# Patient Record
Sex: FEMALE | Race: ASIAN | NOT HISPANIC OR LATINO
[De-identification: names, ages, dates, MRNs, and addresses within clinical notes are randomized per-mention and may not be internally consistent; named-entity substitution may affect disease eponyms.]

---

## 2020-09-11 ENCOUNTER — APPOINTMENT (OUTPATIENT)
Dept: ANTEPARTUM | Facility: CLINIC | Age: 30
End: 2020-09-11
Payer: MEDICAID

## 2020-09-11 PROCEDURE — 76805 OB US >/= 14 WKS SNGL FETUS: CPT

## 2020-10-02 ENCOUNTER — APPOINTMENT (OUTPATIENT)
Dept: ANTEPARTUM | Facility: CLINIC | Age: 30
End: 2020-10-02
Payer: MEDICAID

## 2020-10-02 PROCEDURE — 76811 OB US DETAILED SNGL FETUS: CPT

## 2020-10-02 PROCEDURE — 76817 TRANSVAGINAL US OBSTETRIC: CPT

## 2020-11-24 ENCOUNTER — APPOINTMENT (OUTPATIENT)
Dept: OBGYN | Facility: CLINIC | Age: 30
End: 2020-11-24
Payer: MEDICAID

## 2020-11-24 PROCEDURE — 0502F SUBSEQUENT PRENATAL CARE: CPT

## 2020-12-12 ENCOUNTER — APPOINTMENT (OUTPATIENT)
Dept: ANTEPARTUM | Facility: CLINIC | Age: 30
End: 2020-12-12
Payer: MEDICAID

## 2020-12-12 PROCEDURE — 99072 ADDL SUPL MATRL&STAF TM PHE: CPT

## 2020-12-12 PROCEDURE — 76819 FETAL BIOPHYS PROFIL W/O NST: CPT

## 2020-12-12 PROCEDURE — 76816 OB US FOLLOW-UP PER FETUS: CPT

## 2020-12-15 ENCOUNTER — APPOINTMENT (OUTPATIENT)
Dept: OBGYN | Facility: CLINIC | Age: 30
End: 2020-12-15
Payer: MEDICAID

## 2020-12-15 PROCEDURE — 0502F SUBSEQUENT PRENATAL CARE: CPT

## 2020-12-21 ENCOUNTER — APPOINTMENT (OUTPATIENT)
Dept: OBGYN | Facility: CLINIC | Age: 30
End: 2020-12-21
Payer: MEDICAID

## 2020-12-21 PROCEDURE — 0502F SUBSEQUENT PRENATAL CARE: CPT

## 2021-01-02 ENCOUNTER — APPOINTMENT (OUTPATIENT)
Dept: OBGYN | Facility: CLINIC | Age: 31
End: 2021-01-02
Payer: MEDICAID

## 2021-01-02 PROCEDURE — 0502F SUBSEQUENT PRENATAL CARE: CPT

## 2021-01-07 ENCOUNTER — APPOINTMENT (OUTPATIENT)
Dept: OBGYN | Facility: CLINIC | Age: 31
End: 2021-01-07
Payer: MEDICAID

## 2021-01-07 PROCEDURE — 0502F SUBSEQUENT PRENATAL CARE: CPT

## 2021-01-16 ENCOUNTER — APPOINTMENT (OUTPATIENT)
Dept: ANTEPARTUM | Facility: CLINIC | Age: 31
End: 2021-01-16
Payer: MEDICAID

## 2021-01-16 PROCEDURE — 0502F SUBSEQUENT PRENATAL CARE: CPT

## 2021-01-25 ENCOUNTER — INPATIENT (INPATIENT)
Facility: HOSPITAL | Age: 31
LOS: 3 days | Discharge: HOME HEALTH | End: 2021-01-29
Attending: OBSTETRICS & GYNECOLOGY | Admitting: OBSTETRICS & GYNECOLOGY
Payer: MEDICAID

## 2021-01-25 ENCOUNTER — EMERGENCY (EMERGENCY)
Facility: HOSPITAL | Age: 31
LOS: 1 days | Discharge: STILL ADMITTED OR ADMINISTRATIVE | End: 2021-01-25
Admitting: EMERGENCY MEDICINE

## 2021-01-25 ENCOUNTER — APPOINTMENT (OUTPATIENT)
Dept: OBGYN | Facility: CLINIC | Age: 31
End: 2021-01-25
Payer: MEDICAID

## 2021-01-25 VITALS
TEMPERATURE: 98 F | SYSTOLIC BLOOD PRESSURE: 152 MMHG | RESPIRATION RATE: 18 BRPM | DIASTOLIC BLOOD PRESSURE: 99 MMHG | OXYGEN SATURATION: 100 % | HEART RATE: 105 BPM

## 2021-01-25 VITALS — DIASTOLIC BLOOD PRESSURE: 92 MMHG | HEART RATE: 100 BPM | SYSTOLIC BLOOD PRESSURE: 145 MMHG

## 2021-01-25 DIAGNOSIS — O26.899 OTHER SPECIFIED PREGNANCY RELATED CONDITIONS, UNSPECIFIED TRIMESTER: ICD-10-CM

## 2021-01-25 DIAGNOSIS — Z3A.00 WEEKS OF GESTATION OF PREGNANCY NOT SPECIFIED: ICD-10-CM

## 2021-01-25 PROBLEM — Z00.00 ENCOUNTER FOR PREVENTIVE HEALTH EXAMINATION: Status: ACTIVE | Noted: 2021-01-25

## 2021-01-25 PROCEDURE — 0502F SUBSEQUENT PRENATAL CARE: CPT

## 2021-01-25 RX ORDER — SODIUM CHLORIDE 9 MG/ML
1000 INJECTION, SOLUTION INTRAVENOUS
Refills: 0 | Status: DISCONTINUED | OUTPATIENT
Start: 2021-01-25 | End: 2021-01-26

## 2021-01-25 RX ORDER — CITRIC ACID/SODIUM CITRATE 300-500 MG
15 SOLUTION, ORAL ORAL EVERY 6 HOURS
Refills: 0 | Status: DISCONTINUED | OUTPATIENT
Start: 2021-01-25 | End: 2021-01-26

## 2021-01-25 RX ORDER — OXYTOCIN 10 UNIT/ML
333.33 VIAL (ML) INJECTION
Qty: 20 | Refills: 0 | Status: DISCONTINUED | OUTPATIENT
Start: 2021-01-25 | End: 2021-01-27

## 2021-01-25 NOTE — ED ADULT TRIAGE NOTE - CHIEF COMPLAINT QUOTE
Patient reports about 39 weeks pregnant sent by MD Pereira for concern of HTN. Patient denies any pain or vaginal bleeding. KULWANT 1/30. D&T called, Per Shanita, patient to go to L&D.

## 2021-01-26 LAB
ALBUMIN SERPL ELPH-MCNC: 3.4 G/DL — SIGNIFICANT CHANGE UP (ref 3.3–5)
ALBUMIN SERPL ELPH-MCNC: 3.6 G/DL — SIGNIFICANT CHANGE UP (ref 3.3–5)
ALBUMIN SERPL ELPH-MCNC: 3.7 G/DL — SIGNIFICANT CHANGE UP (ref 3.3–5)
ALP SERPL-CCNC: 165 U/L — HIGH (ref 40–120)
ALP SERPL-CCNC: 166 U/L — HIGH (ref 40–120)
ALP SERPL-CCNC: 171 U/L — HIGH (ref 40–120)
ALT FLD-CCNC: 16 U/L — SIGNIFICANT CHANGE UP (ref 4–33)
ALT FLD-CCNC: 20 U/L — SIGNIFICANT CHANGE UP (ref 4–33)
ALT FLD-CCNC: 21 U/L — SIGNIFICANT CHANGE UP (ref 4–33)
ANION GAP SERPL CALC-SCNC: 11 MMOL/L — SIGNIFICANT CHANGE UP (ref 7–14)
ANION GAP SERPL CALC-SCNC: 12 MMOL/L — SIGNIFICANT CHANGE UP (ref 7–14)
ANION GAP SERPL CALC-SCNC: 15 MMOL/L — HIGH (ref 7–14)
APPEARANCE UR: ABNORMAL
APPEARANCE UR: CLEAR — SIGNIFICANT CHANGE UP
APTT BLD: 29.8 SEC — SIGNIFICANT CHANGE UP (ref 27–36.3)
APTT BLD: 29.8 SEC — SIGNIFICANT CHANGE UP (ref 27–36.3)
AST SERPL-CCNC: 25 U/L — SIGNIFICANT CHANGE UP (ref 4–32)
AST SERPL-CCNC: 28 U/L — SIGNIFICANT CHANGE UP (ref 4–32)
AST SERPL-CCNC: 35 U/L — HIGH (ref 4–32)
BACTERIA # UR AUTO: ABNORMAL
BASOPHILS # BLD AUTO: 0 K/UL — SIGNIFICANT CHANGE UP (ref 0–0.2)
BASOPHILS # BLD AUTO: 0.04 K/UL — SIGNIFICANT CHANGE UP (ref 0–0.2)
BASOPHILS # BLD AUTO: 0.04 K/UL — SIGNIFICANT CHANGE UP (ref 0–0.2)
BASOPHILS NFR BLD AUTO: 0 % — SIGNIFICANT CHANGE UP (ref 0–2)
BASOPHILS NFR BLD AUTO: 0.3 % — SIGNIFICANT CHANGE UP (ref 0–2)
BASOPHILS NFR BLD AUTO: 0.5 % — SIGNIFICANT CHANGE UP (ref 0–2)
BILIRUB SERPL-MCNC: 0.2 MG/DL — SIGNIFICANT CHANGE UP (ref 0.2–1.2)
BILIRUB SERPL-MCNC: <0.2 MG/DL — SIGNIFICANT CHANGE UP (ref 0.2–1.2)
BILIRUB SERPL-MCNC: <0.2 MG/DL — SIGNIFICANT CHANGE UP (ref 0.2–1.2)
BILIRUB UR-MCNC: NEGATIVE — SIGNIFICANT CHANGE UP
BILIRUB UR-MCNC: NEGATIVE — SIGNIFICANT CHANGE UP
BLD GP AB SCN SERPL QL: NEGATIVE — SIGNIFICANT CHANGE UP
BUN SERPL-MCNC: 10 MG/DL — SIGNIFICANT CHANGE UP (ref 7–23)
BUN SERPL-MCNC: 10 MG/DL — SIGNIFICANT CHANGE UP (ref 7–23)
BUN SERPL-MCNC: 12 MG/DL — SIGNIFICANT CHANGE UP (ref 7–23)
CALCIUM SERPL-MCNC: 10 MG/DL — SIGNIFICANT CHANGE UP (ref 8.4–10.5)
CALCIUM SERPL-MCNC: 9 MG/DL — SIGNIFICANT CHANGE UP (ref 8.4–10.5)
CALCIUM SERPL-MCNC: 9.1 MG/DL — SIGNIFICANT CHANGE UP (ref 8.4–10.5)
CHLORIDE SERPL-SCNC: 103 MMOL/L — SIGNIFICANT CHANGE UP (ref 98–107)
CHLORIDE SERPL-SCNC: 103 MMOL/L — SIGNIFICANT CHANGE UP (ref 98–107)
CHLORIDE SERPL-SCNC: 105 MMOL/L — SIGNIFICANT CHANGE UP (ref 98–107)
CO2 SERPL-SCNC: 17 MMOL/L — LOW (ref 22–31)
CO2 SERPL-SCNC: 19 MMOL/L — LOW (ref 22–31)
CO2 SERPL-SCNC: 23 MMOL/L — SIGNIFICANT CHANGE UP (ref 22–31)
COLOR SPEC: SIGNIFICANT CHANGE UP
COLOR SPEC: YELLOW — SIGNIFICANT CHANGE UP
CREAT ?TM UR-MCNC: 151 MG/DL — SIGNIFICANT CHANGE UP
CREAT SERPL-MCNC: 0.48 MG/DL — LOW (ref 0.5–1.3)
CREAT SERPL-MCNC: 0.59 MG/DL — SIGNIFICANT CHANGE UP (ref 0.5–1.3)
CREAT SERPL-MCNC: 0.63 MG/DL — SIGNIFICANT CHANGE UP (ref 0.5–1.3)
DIFF PNL FLD: ABNORMAL
DIFF PNL FLD: NEGATIVE — SIGNIFICANT CHANGE UP
EOSINOPHIL # BLD AUTO: 0 K/UL — SIGNIFICANT CHANGE UP (ref 0–0.5)
EOSINOPHIL # BLD AUTO: 0.01 K/UL — SIGNIFICANT CHANGE UP (ref 0–0.5)
EOSINOPHIL # BLD AUTO: 0.05 K/UL — SIGNIFICANT CHANGE UP (ref 0–0.5)
EOSINOPHIL NFR BLD AUTO: 0 % — SIGNIFICANT CHANGE UP (ref 0–6)
EOSINOPHIL NFR BLD AUTO: 0.1 % — SIGNIFICANT CHANGE UP (ref 0–6)
EOSINOPHIL NFR BLD AUTO: 0.6 % — SIGNIFICANT CHANGE UP (ref 0–6)
EPI CELLS # UR: 2 /HPF — SIGNIFICANT CHANGE UP (ref 0–5)
FIBRINOGEN PPP-MCNC: 496 MG/DL — SIGNIFICANT CHANGE UP (ref 290–520)
FIBRINOGEN PPP-MCNC: 675 MG/DL — HIGH (ref 290–520)
FIBRINOGEN PPP-MCNC: 712 MG/DL — HIGH (ref 290–520)
GLUCOSE SERPL-MCNC: 63 MG/DL — LOW (ref 70–99)
GLUCOSE SERPL-MCNC: 93 MG/DL — SIGNIFICANT CHANGE UP (ref 70–99)
GLUCOSE SERPL-MCNC: 94 MG/DL — SIGNIFICANT CHANGE UP (ref 70–99)
GLUCOSE UR QL: NEGATIVE — SIGNIFICANT CHANGE UP
GLUCOSE UR QL: NEGATIVE — SIGNIFICANT CHANGE UP
HCT VFR BLD CALC: 36.6 % — SIGNIFICANT CHANGE UP (ref 34.5–45)
HCT VFR BLD CALC: 37.4 % — SIGNIFICANT CHANGE UP (ref 34.5–45)
HCT VFR BLD CALC: 39.6 % — SIGNIFICANT CHANGE UP (ref 34.5–45)
HGB BLD-MCNC: 11.6 G/DL — SIGNIFICANT CHANGE UP (ref 11.5–15.5)
HGB BLD-MCNC: 12.3 G/DL — SIGNIFICANT CHANGE UP (ref 11.5–15.5)
HGB BLD-MCNC: 12.5 G/DL — SIGNIFICANT CHANGE UP (ref 11.5–15.5)
HYALINE CASTS # UR AUTO: 12 /LPF — HIGH (ref 0–7)
IANC: 11.17 K/UL — HIGH (ref 1.5–8.5)
IANC: 5.12 K/UL — SIGNIFICANT CHANGE UP (ref 1.5–8.5)
IANC: 5.73 K/UL — SIGNIFICANT CHANGE UP (ref 1.5–8.5)
IMM GRANULOCYTES NFR BLD AUTO: 0.3 % — SIGNIFICANT CHANGE UP (ref 0–1.5)
IMM GRANULOCYTES NFR BLD AUTO: 0.4 % — SIGNIFICANT CHANGE UP (ref 0–1.5)
INR BLD: 1 RATIO — SIGNIFICANT CHANGE UP (ref 0.88–1.16)
INR BLD: 1.01 RATIO — SIGNIFICANT CHANGE UP (ref 0.88–1.16)
KETONES UR-MCNC: NEGATIVE — SIGNIFICANT CHANGE UP
KETONES UR-MCNC: NEGATIVE — SIGNIFICANT CHANGE UP
LDH SERPL L TO P-CCNC: 224 U/L — SIGNIFICANT CHANGE UP (ref 135–225)
LDH SERPL L TO P-CCNC: 233 U/L — HIGH (ref 135–225)
LDH SERPL L TO P-CCNC: 326 U/L — HIGH (ref 135–225)
LEUKOCYTE ESTERASE UR-ACNC: NEGATIVE — SIGNIFICANT CHANGE UP
LEUKOCYTE ESTERASE UR-ACNC: NEGATIVE — SIGNIFICANT CHANGE UP
LYMPHOCYTES # BLD AUTO: 16.1 % — SIGNIFICANT CHANGE UP (ref 13–44)
LYMPHOCYTES # BLD AUTO: 2.28 K/UL — SIGNIFICANT CHANGE UP (ref 1–3.3)
LYMPHOCYTES # BLD AUTO: 2.36 K/UL — SIGNIFICANT CHANGE UP (ref 1–3.3)
LYMPHOCYTES # BLD AUTO: 2.86 K/UL — SIGNIFICANT CHANGE UP (ref 1–3.3)
LYMPHOCYTES # BLD AUTO: 29.7 % — SIGNIFICANT CHANGE UP (ref 13–44)
LYMPHOCYTES # BLD AUTO: 31.3 % — SIGNIFICANT CHANGE UP (ref 13–44)
MANUAL SMEAR VERIFICATION: SIGNIFICANT CHANGE UP
MCHC RBC-ENTMCNC: 26.9 PG — LOW (ref 27–34)
MCHC RBC-ENTMCNC: 26.9 PG — LOW (ref 27–34)
MCHC RBC-ENTMCNC: 27.6 PG — SIGNIFICANT CHANGE UP (ref 27–34)
MCHC RBC-ENTMCNC: 31.6 GM/DL — LOW (ref 32–36)
MCHC RBC-ENTMCNC: 31.7 GM/DL — LOW (ref 32–36)
MCHC RBC-ENTMCNC: 32.9 GM/DL — SIGNIFICANT CHANGE UP (ref 32–36)
MCV RBC AUTO: 84 FL — SIGNIFICANT CHANGE UP (ref 80–100)
MCV RBC AUTO: 84.9 FL — SIGNIFICANT CHANGE UP (ref 80–100)
MCV RBC AUTO: 85.3 FL — SIGNIFICANT CHANGE UP (ref 80–100)
MONOCYTES # BLD AUTO: 0.16 K/UL — SIGNIFICANT CHANGE UP (ref 0–0.9)
MONOCYTES # BLD AUTO: 0.36 K/UL — SIGNIFICANT CHANGE UP (ref 0–0.9)
MONOCYTES # BLD AUTO: 0.61 K/UL — SIGNIFICANT CHANGE UP (ref 0–0.9)
MONOCYTES NFR BLD AUTO: 1.7 % — LOW (ref 2–14)
MONOCYTES NFR BLD AUTO: 4.3 % — SIGNIFICANT CHANGE UP (ref 2–14)
MONOCYTES NFR BLD AUTO: 4.5 % — SIGNIFICANT CHANGE UP (ref 2–14)
NEUTROPHILS # BLD AUTO: 11.17 K/UL — HIGH (ref 1.8–7.4)
NEUTROPHILS # BLD AUTO: 5.12 K/UL — SIGNIFICANT CHANGE UP (ref 1.8–7.4)
NEUTROPHILS # BLD AUTO: 5.8 K/UL — SIGNIFICANT CHANGE UP (ref 1.8–7.4)
NEUTROPHILS NFR BLD AUTO: 61.8 % — SIGNIFICANT CHANGE UP (ref 43–77)
NEUTROPHILS NFR BLD AUTO: 64.4 % — SIGNIFICANT CHANGE UP (ref 43–77)
NEUTROPHILS NFR BLD AUTO: 78.8 % — HIGH (ref 43–77)
NEUTS BAND # BLD: 1.7 % — SIGNIFICANT CHANGE UP (ref 0–6)
NITRITE UR-MCNC: NEGATIVE — SIGNIFICANT CHANGE UP
NITRITE UR-MCNC: NEGATIVE — SIGNIFICANT CHANGE UP
NRBC # BLD: 0 /100 WBCS — SIGNIFICANT CHANGE UP
NRBC # BLD: 0 /100 WBCS — SIGNIFICANT CHANGE UP
NRBC # FLD: 0 K/UL — SIGNIFICANT CHANGE UP
NRBC # FLD: 0 K/UL — SIGNIFICANT CHANGE UP
PH UR: 6.5 — SIGNIFICANT CHANGE UP (ref 5–8)
PH UR: 6.5 — SIGNIFICANT CHANGE UP (ref 5–8)
PLAT MORPH BLD: NORMAL — SIGNIFICANT CHANGE UP
PLATELET # BLD AUTO: 146 K/UL — LOW (ref 150–400)
PLATELET # BLD AUTO: 167 K/UL — SIGNIFICANT CHANGE UP (ref 150–400)
PLATELET # BLD AUTO: 168 K/UL — SIGNIFICANT CHANGE UP (ref 150–400)
PLATELET COUNT - ESTIMATE: NORMAL — SIGNIFICANT CHANGE UP
POTASSIUM SERPL-MCNC: 3.7 MMOL/L — SIGNIFICANT CHANGE UP (ref 3.5–5.3)
POTASSIUM SERPL-MCNC: 4.3 MMOL/L — SIGNIFICANT CHANGE UP (ref 3.5–5.3)
POTASSIUM SERPL-MCNC: 4.4 MMOL/L — SIGNIFICANT CHANGE UP (ref 3.5–5.3)
POTASSIUM SERPL-SCNC: 3.7 MMOL/L — SIGNIFICANT CHANGE UP (ref 3.5–5.3)
POTASSIUM SERPL-SCNC: 4.3 MMOL/L — SIGNIFICANT CHANGE UP (ref 3.5–5.3)
POTASSIUM SERPL-SCNC: 4.4 MMOL/L — SIGNIFICANT CHANGE UP (ref 3.5–5.3)
PROT ?TM UR-MCNC: 254 MG/DL — SIGNIFICANT CHANGE UP
PROT ?TM UR-MCNC: 254 MG/DL — SIGNIFICANT CHANGE UP
PROT SERPL-MCNC: 6.7 G/DL — SIGNIFICANT CHANGE UP (ref 6–8.3)
PROT SERPL-MCNC: 6.9 G/DL — SIGNIFICANT CHANGE UP (ref 6–8.3)
PROT SERPL-MCNC: 7.1 G/DL — SIGNIFICANT CHANGE UP (ref 6–8.3)
PROT UR-MCNC: ABNORMAL
PROT UR-MCNC: ABNORMAL
PROT/CREAT UR-RTO: 1.7 RATIO — HIGH (ref 0–0.2)
PROTHROM AB SERPL-ACNC: 11.4 SEC — SIGNIFICANT CHANGE UP (ref 10.6–13.6)
PROTHROM AB SERPL-ACNC: 11.5 SEC — SIGNIFICANT CHANGE UP (ref 10.6–13.6)
RBC # BLD: 4.31 M/UL — SIGNIFICANT CHANGE UP (ref 3.8–5.2)
RBC # BLD: 4.45 M/UL — SIGNIFICANT CHANGE UP (ref 3.8–5.2)
RBC # BLD: 4.64 M/UL — SIGNIFICANT CHANGE UP (ref 3.8–5.2)
RBC # FLD: 13.4 % — SIGNIFICANT CHANGE UP (ref 10.3–14.5)
RBC # FLD: 13.4 % — SIGNIFICANT CHANGE UP (ref 10.3–14.5)
RBC # FLD: 13.7 % — SIGNIFICANT CHANGE UP (ref 10.3–14.5)
RBC BLD AUTO: NORMAL — SIGNIFICANT CHANGE UP
RBC CASTS # UR COMP ASSIST: 2 /HPF — SIGNIFICANT CHANGE UP (ref 0–4)
RH IG SCN BLD-IMP: POSITIVE — SIGNIFICANT CHANGE UP
RH IG SCN BLD-IMP: POSITIVE — SIGNIFICANT CHANGE UP
SARS-COV-2 RNA SPEC QL NAA+PROBE: SIGNIFICANT CHANGE UP
SODIUM SERPL-SCNC: 135 MMOL/L — SIGNIFICANT CHANGE UP (ref 135–145)
SODIUM SERPL-SCNC: 136 MMOL/L — SIGNIFICANT CHANGE UP (ref 135–145)
SODIUM SERPL-SCNC: 137 MMOL/L — SIGNIFICANT CHANGE UP (ref 135–145)
SP GR SPEC: 1.01 — SIGNIFICANT CHANGE UP (ref 1.01–1.02)
SP GR SPEC: 1.02 — SIGNIFICANT CHANGE UP (ref 1.01–1.02)
T PALLIDUM AB TITR SER: NEGATIVE — SIGNIFICANT CHANGE UP
URATE SERPL-MCNC: 4 MG/DL — SIGNIFICANT CHANGE UP (ref 2.5–7)
URATE SERPL-MCNC: 4.4 MG/DL — SIGNIFICANT CHANGE UP (ref 2.5–7)
URATE SERPL-MCNC: 5.2 MG/DL — SIGNIFICANT CHANGE UP (ref 2.5–7)
UROBILINOGEN FLD QL: SIGNIFICANT CHANGE UP
UROBILINOGEN FLD QL: SIGNIFICANT CHANGE UP
VARIANT LYMPHS # BLD: 3.5 % — SIGNIFICANT CHANGE UP (ref 0–6)
WBC # BLD: 14.17 K/UL — HIGH (ref 3.8–10.5)
WBC # BLD: 7.95 K/UL — SIGNIFICANT CHANGE UP (ref 3.8–10.5)
WBC # BLD: 9.13 K/UL — SIGNIFICANT CHANGE UP (ref 3.8–10.5)
WBC # FLD AUTO: 14.17 K/UL — HIGH (ref 3.8–10.5)
WBC # FLD AUTO: 7.95 K/UL — SIGNIFICANT CHANGE UP (ref 3.8–10.5)
WBC # FLD AUTO: 9.13 K/UL — SIGNIFICANT CHANGE UP (ref 3.8–10.5)
WBC UR QL: 34 /HPF — HIGH (ref 0–5)

## 2021-01-26 RX ORDER — SODIUM CHLORIDE 9 MG/ML
1000 INJECTION, SOLUTION INTRAVENOUS
Refills: 0 | Status: DISCONTINUED | OUTPATIENT
Start: 2021-01-26 | End: 2021-01-27

## 2021-01-26 RX ORDER — ONDANSETRON 8 MG/1
4 TABLET, FILM COATED ORAL ONCE
Refills: 0 | Status: COMPLETED | OUTPATIENT
Start: 2021-01-26 | End: 2021-01-26

## 2021-01-26 RX ORDER — ACETAMINOPHEN 500 MG
975 TABLET ORAL ONCE
Refills: 0 | Status: COMPLETED | OUTPATIENT
Start: 2021-01-26 | End: 2021-01-26

## 2021-01-26 RX ORDER — DIPHENHYDRAMINE HCL 50 MG
25 CAPSULE ORAL EVERY 4 HOURS
Refills: 0 | Status: DISCONTINUED | OUTPATIENT
Start: 2021-01-26 | End: 2021-01-29

## 2021-01-26 RX ADMIN — SODIUM CHLORIDE 125 MILLILITER(S): 9 INJECTION, SOLUTION INTRAVENOUS at 20:32

## 2021-01-26 RX ADMIN — Medication 25 MILLIGRAM(S): at 20:33

## 2021-01-26 RX ADMIN — ONDANSETRON 4 MILLIGRAM(S): 8 TABLET, FILM COATED ORAL at 14:35

## 2021-01-26 RX ADMIN — Medication 975 MILLIGRAM(S): at 08:22

## 2021-01-26 NOTE — CHART NOTE - NSCHARTNOTEFT_GEN_A_CORE
Np note    Pt seen for increased pain 5/10. Pt vomited sp PO cytotec and states she has N/V every other day.    T(C): 36.6 (2021 07:10), Max: 36.9 (2021 20:25)  T(F): 97.88 (2021 07:10), Max: 98.4 (2021 20:25)  HR: 113 (2021 08:32) (90 - 125)  BP: 135/87 (2021 08:10) (104/58 - 156/88)  RR: 14 (2021 22:38) (14 - 18)  SpO2: 99% (2021 08:32) (93% - 100%)  /mod girish/+ accels/no decels  [place] irregular  SVE /-3    31 y/o  @39+3 gHTN IOl asymptomatic    For HELLP labs -> epidural -> cervical balloon/switch to vaginal cytotec  D/W Dr. Kiki del castillo, NP

## 2021-01-26 NOTE — CHART NOTE - NSCHARTNOTEFT_GEN_A_CORE
R1 Tracing note    FHT evaluated and noted to be Category 1. Baseline 135, moderate variability, accelerations present, no decelerations  [place]: q3 min    Kat Boston PGY1

## 2021-01-26 NOTE — OB PROVIDER H&P - ASSESSMENT
Assessment  – No prenatal issues. GBS _.    Plan  Admit to LND. Routine Labs. IVF.  Expectant management/IOL w/  Fetus: cat 1 tracing. Continuous EFM.   Prenatal issues: none  GBS _  COVID pending     Patient discussed with attending physician,  .      Sravani Guidry MD PGY1 Assessment  –  at 39w2d IOL 2/2 gHTN. GBS neg.    Plan  Admit to LND. Routine Labs. IVF.  IOL w/ PO cytotec  Fetus: cat 1 tracing. Continuous EFM.   Prenatal issues: gHTN, blood pressures in mild range. HELLP labs sent   GBS neg  COVID pending     Patient discussed with attending physician, Dr. Ratliff.      Sravani Guidry MD PGY1

## 2021-01-26 NOTE — CHART NOTE - NSCHARTNOTEFT_GEN_A_CORE
Np note    Pt seen for placement of cervical balloon    T(C): 36.6 (26 Jan 2021 07:10), Max: 36.9 (25 Jan 2021 20:25)  T(F): 97.88 (26 Jan 2021 07:10), Max: 98.4 (25 Jan 2021 20:25)  HR: 95 (26 Jan 2021 10:32) (90 - 139)  BP: 138/84 (26 Jan 2021 10:32) (104/58 - 156/88)  RR: 14 (25 Jan 2021 22:38) (14 - 18)  SpO2: 98% (26 Jan 2021 10:29) (93% - 100%)  /mod girish/+ accels/no decels  [place] q3-5min  SVE 1/70/-3    Cervical balloon placed without incident. Vaginal cytotec #1 placed.     -Re-evaluate in 3 hours  -Maintain cervical balloon  -D/W Dr. Kiki del castillo, NP Np note    Pt seen for placement of cervical balloon    T(C): 36.6 (2021 07:10), Max: 36.9 (2021 20:25)  T(F): 97.88 (2021 07:10), Max: 98.4 (2021 20:25)  HR: 95 (2021 10:32) (90 - 139)  BP: 138/84 (2021 10:32) (104/58 - 156/88)  RR: 14 (2021 22:38) (14 - 18)  SpO2: 98% (2021 10:29) (93% - 100%)  /mod girihs/+ accels/no decels  [place] q3-5min  SVE 1/70/-3               12.3   9.13  )-----------( 168      (  @ 09:18 )             37.4      @ 09:18    136  |  105  |  10  ----------------------------<  94  3.7   |  19  |  0.59    Ca    9.1       @ 09:18    TPro  6.7  /  Alb  3.6  /  TBili  <0.2  /  DBili  x   /  AST  25  /  ALT  16  /  AlkPhos  165   @ 09:18    PT/INR - (  @ 09:02 )   PT: 11.4 sec;   INR: 1.00 ratio    PTT - (  @ 09:02 )  PTT:29.8 sec    Uric Acid: ( @ 09:18)  4.4      Fibrinogen: ( @ 09:18)  --       LDH: ( @ 09:18)  224      Cervical balloon placed without incident. Instilled with 60/60ccs. Vaginal cytotec #1 placed.     29 y/o  39+3w IOL gHTN now meeting criteria for PEC with a PCR 1.7 normal hellp labs    -Re-evaluate in 3 hours  -Maintain cervical balloon  -D/W Dr. Kiki del castillo, NP

## 2021-01-26 NOTE — OB PROVIDER H&P - HISTORY OF PRESENT ILLNESS
R1 Admission H&P    Subjective  HPI: 30yoF  39w2d gestational age presents for IOL for gHTN. +FM. -LOF. -CTXs. -VB. Pt denies any other concerns.    – PNC: gHTN. Anemia treated with Fe. GBS neg.  EFW 3200g   – OBHx: 1x SAB, 1x TOP  – GynHx: Gonorrhea s/p Ceftriaxone this pregnancy. Hx of abnl pap smear s/p colposcopy. Otherwise denies  – PMH: denies  – PSH: denies  – Psych: denies   – Social: denies   – Meds: PNV, Fe, folic acid   – Allergies: NKDA  – Will accept blood transfusions? Yes

## 2021-01-27 ENCOUNTER — RESULT REVIEW (OUTPATIENT)
Age: 31
End: 2021-01-27

## 2021-01-27 LAB
ALBUMIN SERPL ELPH-MCNC: 3.3 G/DL — SIGNIFICANT CHANGE UP (ref 3.3–5)
ALP SERPL-CCNC: 172 U/L — HIGH (ref 40–120)
ALT FLD-CCNC: 19 U/L — SIGNIFICANT CHANGE UP (ref 4–33)
ANION GAP SERPL CALC-SCNC: 14 MMOL/L — SIGNIFICANT CHANGE UP (ref 7–14)
APTT BLD: 30.6 SEC — SIGNIFICANT CHANGE UP (ref 27–36.3)
AST SERPL-CCNC: 33 U/L — HIGH (ref 4–32)
BASOPHILS # BLD AUTO: 0.04 K/UL — SIGNIFICANT CHANGE UP (ref 0–0.2)
BASOPHILS NFR BLD AUTO: 0.3 % — SIGNIFICANT CHANGE UP (ref 0–2)
BILIRUB SERPL-MCNC: 0.4 MG/DL — SIGNIFICANT CHANGE UP (ref 0.2–1.2)
BUN SERPL-MCNC: 11 MG/DL — SIGNIFICANT CHANGE UP (ref 7–23)
CALCIUM SERPL-MCNC: 9.3 MG/DL — SIGNIFICANT CHANGE UP (ref 8.4–10.5)
CHLORIDE SERPL-SCNC: 100 MMOL/L — SIGNIFICANT CHANGE UP (ref 98–107)
CO2 SERPL-SCNC: 22 MMOL/L — SIGNIFICANT CHANGE UP (ref 22–31)
CREAT SERPL-MCNC: 0.65 MG/DL — SIGNIFICANT CHANGE UP (ref 0.5–1.3)
EOSINOPHIL # BLD AUTO: 0.02 K/UL — SIGNIFICANT CHANGE UP (ref 0–0.5)
EOSINOPHIL NFR BLD AUTO: 0.2 % — SIGNIFICANT CHANGE UP (ref 0–6)
FIBRINOGEN PPP-MCNC: 746 MG/DL — HIGH (ref 290–520)
GLUCOSE SERPL-MCNC: 73 MG/DL — SIGNIFICANT CHANGE UP (ref 70–99)
HCT VFR BLD CALC: 38.7 % — SIGNIFICANT CHANGE UP (ref 34.5–45)
HGB BLD-MCNC: 12.1 G/DL — SIGNIFICANT CHANGE UP (ref 11.5–15.5)
IANC: 10.28 K/UL — HIGH (ref 1.5–8.5)
IMM GRANULOCYTES NFR BLD AUTO: 0.4 % — SIGNIFICANT CHANGE UP (ref 0–1.5)
INR BLD: 1 RATIO — SIGNIFICANT CHANGE UP (ref 0.88–1.16)
LDH SERPL L TO P-CCNC: 285 U/L — HIGH (ref 135–225)
LYMPHOCYTES # BLD AUTO: 1.83 K/UL — SIGNIFICANT CHANGE UP (ref 1–3.3)
LYMPHOCYTES # BLD AUTO: 14.5 % — SIGNIFICANT CHANGE UP (ref 13–44)
MCHC RBC-ENTMCNC: 26.9 PG — LOW (ref 27–34)
MCHC RBC-ENTMCNC: 31.3 GM/DL — LOW (ref 32–36)
MCV RBC AUTO: 86.2 FL — SIGNIFICANT CHANGE UP (ref 80–100)
MONOCYTES # BLD AUTO: 0.43 K/UL — SIGNIFICANT CHANGE UP (ref 0–0.9)
MONOCYTES NFR BLD AUTO: 3.4 % — SIGNIFICANT CHANGE UP (ref 2–14)
NEUTROPHILS # BLD AUTO: 10.28 K/UL — HIGH (ref 1.8–7.4)
NEUTROPHILS NFR BLD AUTO: 81.2 % — HIGH (ref 43–77)
NRBC # BLD: 0 /100 WBCS — SIGNIFICANT CHANGE UP
NRBC # FLD: 0 K/UL — SIGNIFICANT CHANGE UP
PLATELET # BLD AUTO: 146 K/UL — LOW (ref 150–400)
POTASSIUM SERPL-MCNC: 4.1 MMOL/L — SIGNIFICANT CHANGE UP (ref 3.5–5.3)
POTASSIUM SERPL-SCNC: 4.1 MMOL/L — SIGNIFICANT CHANGE UP (ref 3.5–5.3)
PROT SERPL-MCNC: 6.9 G/DL — SIGNIFICANT CHANGE UP (ref 6–8.3)
PROTHROM AB SERPL-ACNC: 11.5 SEC — SIGNIFICANT CHANGE UP (ref 10.6–13.6)
RBC # BLD: 4.49 M/UL — SIGNIFICANT CHANGE UP (ref 3.8–5.2)
RBC # FLD: 13.5 % — SIGNIFICANT CHANGE UP (ref 10.3–14.5)
SARS-COV-2 IGG SERPL QL IA: NEGATIVE — SIGNIFICANT CHANGE UP
SARS-COV-2 IGM SERPL IA-ACNC: 0.53 INDEX — SIGNIFICANT CHANGE UP
SODIUM SERPL-SCNC: 136 MMOL/L — SIGNIFICANT CHANGE UP (ref 135–145)
URATE SERPL-MCNC: 5.7 MG/DL — SIGNIFICANT CHANGE UP (ref 2.5–7)
WBC # BLD: 12.65 K/UL — HIGH (ref 3.8–10.5)
WBC # FLD AUTO: 12.65 K/UL — HIGH (ref 3.8–10.5)

## 2021-01-27 PROCEDURE — ZZZZZ: CPT

## 2021-01-27 PROCEDURE — 59409 OBSTETRICAL CARE: CPT | Mod: U9

## 2021-01-27 PROCEDURE — 88307 TISSUE EXAM BY PATHOLOGIST: CPT | Mod: 26

## 2021-01-27 RX ORDER — MAGNESIUM HYDROXIDE 400 MG/1
30 TABLET, CHEWABLE ORAL
Refills: 0 | Status: DISCONTINUED | OUTPATIENT
Start: 2021-01-27 | End: 2021-01-29

## 2021-01-27 RX ORDER — OXYTOCIN 10 UNIT/ML
2 VIAL (ML) INJECTION
Qty: 30 | Refills: 0 | Status: DISCONTINUED | OUTPATIENT
Start: 2021-01-27 | End: 2021-01-27

## 2021-01-27 RX ORDER — ACETAMINOPHEN 500 MG
3 TABLET ORAL
Qty: 0 | Refills: 0 | DISCHARGE
Start: 2021-01-27

## 2021-01-27 RX ORDER — ONDANSETRON 8 MG/1
4 TABLET, FILM COATED ORAL ONCE
Refills: 0 | Status: COMPLETED | OUTPATIENT
Start: 2021-01-27 | End: 2021-01-27

## 2021-01-27 RX ORDER — IBUPROFEN 200 MG
600 TABLET ORAL EVERY 6 HOURS
Refills: 0 | Status: COMPLETED | OUTPATIENT
Start: 2021-01-27 | End: 2021-12-26

## 2021-01-27 RX ORDER — SIMETHICONE 80 MG/1
80 TABLET, CHEWABLE ORAL EVERY 4 HOURS
Refills: 0 | Status: DISCONTINUED | OUTPATIENT
Start: 2021-01-27 | End: 2021-01-29

## 2021-01-27 RX ORDER — IRON,CARBONYL 45 MG
1 TABLET ORAL
Qty: 0 | Refills: 0 | DISCHARGE

## 2021-01-27 RX ORDER — DIBUCAINE 1 %
1 OINTMENT (GRAM) RECTAL EVERY 6 HOURS
Refills: 0 | Status: DISCONTINUED | OUTPATIENT
Start: 2021-01-27 | End: 2021-01-29

## 2021-01-27 RX ORDER — ACETAMINOPHEN 500 MG
975 TABLET ORAL
Refills: 0 | Status: DISCONTINUED | OUTPATIENT
Start: 2021-01-27 | End: 2021-01-29

## 2021-01-27 RX ORDER — SODIUM CHLORIDE 9 MG/ML
3 INJECTION INTRAMUSCULAR; INTRAVENOUS; SUBCUTANEOUS EVERY 8 HOURS
Refills: 0 | Status: DISCONTINUED | OUTPATIENT
Start: 2021-01-27 | End: 2021-01-29

## 2021-01-27 RX ORDER — AER TRAVELER 0.5 G/1
1 SOLUTION RECTAL; TOPICAL EVERY 4 HOURS
Refills: 0 | Status: DISCONTINUED | OUTPATIENT
Start: 2021-01-27 | End: 2021-01-29

## 2021-01-27 RX ORDER — OXYCODONE HYDROCHLORIDE 5 MG/1
5 TABLET ORAL ONCE
Refills: 0 | Status: DISCONTINUED | OUTPATIENT
Start: 2021-01-27 | End: 2021-01-29

## 2021-01-27 RX ORDER — SENNA PLUS 8.6 MG/1
1 TABLET ORAL
Refills: 0 | Status: DISCONTINUED | OUTPATIENT
Start: 2021-01-27 | End: 2021-01-29

## 2021-01-27 RX ORDER — SODIUM CHLORIDE 9 MG/ML
500 INJECTION, SOLUTION INTRAVENOUS ONCE
Refills: 0 | Status: COMPLETED | OUTPATIENT
Start: 2021-01-27 | End: 2021-01-27

## 2021-01-27 RX ORDER — TETANUS TOXOID, REDUCED DIPHTHERIA TOXOID AND ACELLULAR PERTUSSIS VACCINE, ADSORBED 5; 2.5; 8; 8; 2.5 [IU]/.5ML; [IU]/.5ML; UG/.5ML; UG/.5ML; UG/.5ML
0.5 SUSPENSION INTRAMUSCULAR ONCE
Refills: 0 | Status: DISCONTINUED | OUTPATIENT
Start: 2021-01-27 | End: 2021-01-29

## 2021-01-27 RX ORDER — KETOROLAC TROMETHAMINE 30 MG/ML
30 SYRINGE (ML) INJECTION ONCE
Refills: 0 | Status: DISCONTINUED | OUTPATIENT
Start: 2021-01-27 | End: 2021-01-27

## 2021-01-27 RX ORDER — BENZOCAINE 10 %
1 GEL (GRAM) MUCOUS MEMBRANE EVERY 6 HOURS
Refills: 0 | Status: DISCONTINUED | OUTPATIENT
Start: 2021-01-27 | End: 2021-01-29

## 2021-01-27 RX ORDER — HYDROCORTISONE 1 %
1 OINTMENT (GRAM) TOPICAL EVERY 6 HOURS
Refills: 0 | Status: DISCONTINUED | OUTPATIENT
Start: 2021-01-27 | End: 2021-01-29

## 2021-01-27 RX ORDER — OXYTOCIN 10 UNIT/ML
333.33 VIAL (ML) INJECTION
Qty: 20 | Refills: 0 | Status: DISCONTINUED | OUTPATIENT
Start: 2021-01-27 | End: 2021-01-27

## 2021-01-27 RX ORDER — LANOLIN
1 OINTMENT (GRAM) TOPICAL EVERY 6 HOURS
Refills: 0 | Status: DISCONTINUED | OUTPATIENT
Start: 2021-01-27 | End: 2021-01-29

## 2021-01-27 RX ORDER — IBUPROFEN 200 MG
1 TABLET ORAL
Qty: 0 | Refills: 0 | DISCHARGE
Start: 2021-01-27

## 2021-01-27 RX ORDER — OXYCODONE HYDROCHLORIDE 5 MG/1
5 TABLET ORAL
Refills: 0 | Status: DISCONTINUED | OUTPATIENT
Start: 2021-01-27 | End: 2021-01-29

## 2021-01-27 RX ORDER — DIPHENHYDRAMINE HCL 50 MG
25 CAPSULE ORAL EVERY 6 HOURS
Refills: 0 | Status: DISCONTINUED | OUTPATIENT
Start: 2021-01-27 | End: 2021-01-29

## 2021-01-27 RX ORDER — PRAMOXINE HYDROCHLORIDE 150 MG/15G
1 AEROSOL, FOAM RECTAL EVERY 4 HOURS
Refills: 0 | Status: DISCONTINUED | OUTPATIENT
Start: 2021-01-27 | End: 2021-01-29

## 2021-01-27 RX ADMIN — SODIUM CHLORIDE 500 MILLILITER(S): 9 INJECTION, SOLUTION INTRAVENOUS at 15:57

## 2021-01-27 RX ADMIN — Medication 30 MILLIGRAM(S): at 15:22

## 2021-01-27 RX ADMIN — Medication 1000 MILLIUNIT(S)/MIN: at 14:49

## 2021-01-27 RX ADMIN — Medication 2 MILLIUNIT(S)/MIN: at 06:37

## 2021-01-27 RX ADMIN — SODIUM CHLORIDE 3 MILLILITER(S): 9 INJECTION INTRAMUSCULAR; INTRAVENOUS; SUBCUTANEOUS at 21:26

## 2021-01-27 RX ADMIN — Medication 25 MILLIGRAM(S): at 04:22

## 2021-01-27 RX ADMIN — Medication 975 MILLIGRAM(S): at 21:23

## 2021-01-27 RX ADMIN — ONDANSETRON 4 MILLIGRAM(S): 8 TABLET, FILM COATED ORAL at 11:52

## 2021-01-27 NOTE — PROVIDER CONTACT NOTE (CHANGE IN STATUS NOTIFICATION) - ASSESSMENT
Bleeding moderate, fundus firm and at level of umbilicus.  No c/o of chest pain or SOB.  Does not report and feeling of her heart racing or palpitations.  Pr remains afebrile last temp 37.0 orally.

## 2021-01-27 NOTE — OB RN DELIVERY SUMMARY - NS_SEPSISRSKCALC_OBGYN_ALL_OB_FT
EOS calculated successfully. EOS Risk Factor: 0.5/1000 live births ([facility] national incidence); GA=39w4d; Temp=98.24; ROM=3.217; GBS='Negative'; Antibiotics='No antibiotics or any antibiotics < 2 hrs prior to birth'

## 2021-01-27 NOTE — CHART NOTE - NSCHARTNOTEFT_GEN_A_CORE
R1 Chart note    Pt seen at bedside for tachycardia to 130. She feels tired but is otherwise asymptomatic. She denies CP, SOB, abdominal pain, lightheadedness, or dizziness. She is sitting up in bed eating lunch.     Vital Signs Last 24 Hrs  T(C): 37 (2021 14:46), Max: 37 (2021 14:46)  T(F): 98.6 (2021 14:46), Max: 98.6 (2021 14:46)  HR: 112 (2021 15:46) (88 - 139)  BP: 149/70 (2021 15:46) (115/66 - 165/107)  BP(mean): --  RR: 10 (2021 11:30) (10 - 16)  SpO2: 98% (2021 15:46) (76% - 100%)    Gen NAD  Lungs CTABL  CV RRR  ABd soft, nontender-fundus firm. Mild-moderate blood on pad  Ext trace edema               12.1   12.65 )-----------( 146      (  @ 09:51 )             38.7                12.5   14.17 )-----------( 167      (  @ 21:29 )             39.6                12.3   9.13  )-----------( 168      (  @ 09:18 )             37.4     A/P  PPD0   c/b PEC with tachycardia  - 500cc LR bolus  - repeat HELLP labs in the AM  - routine postpartum care      d/w Dr. Adal Boston PGY1

## 2021-01-27 NOTE — OB PROVIDER LABOR PROGRESS NOTE - ASSESSMENT
Plan   Will start pitocin at 615a  EFM Cat 1  Continue EFM/[place]  Anticipate      Sravani Guidry MD PGY1  d/w Dr. Rodas
Plan   Continue PO cytotec  s/p cervical balloon   Re-examine at 0430 to exal for PO cytotec vs pitocin  EFM Cat 1  Continue EFM/[place]  Anticipate      Sravani Giudry MD PGY1  d/w Dr. Rodas
31yo  IOL for PEC  - franck too often for VC  - start PO  - Cervical balloon in place  - HELLP labs q12h  - continuous monitoring  - anticipate     d/w Dr. Perry-Jose Roberto Boston PGY1

## 2021-01-27 NOTE — CHART NOTE - NSCHARTNOTEFT_GEN_A_CORE
Cat 1 fhr tracing, fhr 130 with mod variability, accels, early decels  contractions q3-4min    sve 9.5/100/-1    Vital Signs Last 24 Hrs  T(C): 36.5 (27 Jan 2021 09:22), Max: 36.7 (27 Jan 2021 00:50)  T(F): 97.7 (27 Jan 2021 09:22), Max: 98.06 (27 Jan 2021 00:50)  HR: 111 (27 Jan 2021 11:56) (88 - 139)  BP: 137/84 (27 Jan 2021 11:49) (115/66 - 151/77)  BP(mean): --  RR: 10 (27 Jan 2021 09:22) (10 - 16)  SpO2: 100% (27 Jan 2021 11:56) (87% - 100%)    MATTY Khanna NP

## 2021-01-27 NOTE — CHART NOTE - NSCHARTNOTESELECT_GEN_ALL_CORE
Event Note
NP L&D Note/Event Note
Event Note
Event Note
Labor note
Labor note
NP L&D Note/Event Note
Tracing note

## 2021-01-27 NOTE — OB PROVIDER DELIVERY SUMMARY - AMNIOTIC FLUID ODOR, LABOR
EKG at bedside for exam
NP at bedside for patient update and re-evaluation. Patient to be discharged home. IV removed. Catheter intact with no signs of infiltration. Patient reports ativan that she received this morning helped her. Patient to receive prescription for medication. Waiting for new discharge instructions.
No obvious signs of distress. Respirations symmetrical and non-labored. Skin appropriate and dry. Vital signs stable. Patient verbalized understanding of discharge instructions. Patient ambulated to ED entrance with steady gait.
Patient laying in ED cart. No obvious signs of distress. Respirations symmetrical and non-labored. Skin appropriate and dry. Patient up to bathroom with steady gait. Waiting for results of CTA.
normal

## 2021-01-27 NOTE — CHART NOTE - NSCHARTNOTEFT_GEN_A_CORE
Pt reports increase rectal pressure    sve 6/90/-1    Cat 1 fhr tracing, fhr 135 with mod variability, accels, no decels  contractions q2-4min    Vital Signs Last 24 Hrs  T(C): 36.5 (27 Jan 2021 09:22), Max: 36.7 (27 Jan 2021 00:50)  T(F): 97.7 (27 Jan 2021 09:22), Max: 98.06 (27 Jan 2021 00:50)  HR: 134 (27 Jan 2021 10:21) (88 - 139)  BP: 127/68 (27 Jan 2021 10:21) (115/66 - 151/77)  BP(mean): --  RR: 10 (27 Jan 2021 09:22) (10 - 16)  SpO2: 96% (27 Jan 2021 10:21) (87% - 100%)    continue with pitocin    MATTY Khanna NP

## 2021-01-27 NOTE — OB PROVIDER LABOR PROGRESS NOTE - NS_SUBJECTIVE/OBJECTIVE_OBGYN_ALL_OB_FT
R1 Progress Note     Patient examined for induction progress. Cervical balloon expelled.
R1 Progress Note     Patient examined for evaluation of induction progress, further induction agents. Comfortable at this time.
Pt examined at bedside for cervical change. Vaginal balloon deflated and uterine balloon still in place

## 2021-01-28 ENCOUNTER — TRANSCRIPTION ENCOUNTER (OUTPATIENT)
Age: 31
End: 2021-01-28

## 2021-01-28 LAB
ALBUMIN SERPL ELPH-MCNC: 3.2 G/DL — LOW (ref 3.3–5)
ALP SERPL-CCNC: 147 U/L — HIGH (ref 40–120)
ALT FLD-CCNC: 26 U/L — SIGNIFICANT CHANGE UP (ref 4–33)
ANION GAP SERPL CALC-SCNC: 10 MMOL/L — SIGNIFICANT CHANGE UP (ref 7–14)
APTT BLD: 23 SEC — LOW (ref 27–36.3)
AST SERPL-CCNC: 62 U/L — HIGH (ref 4–32)
BASOPHILS # BLD AUTO: 0.04 K/UL — SIGNIFICANT CHANGE UP (ref 0–0.2)
BASOPHILS NFR BLD AUTO: 0.3 % — SIGNIFICANT CHANGE UP (ref 0–2)
BILIRUB SERPL-MCNC: 0.2 MG/DL — SIGNIFICANT CHANGE UP (ref 0.2–1.2)
BUN SERPL-MCNC: 9 MG/DL — SIGNIFICANT CHANGE UP (ref 7–23)
CALCIUM SERPL-MCNC: 8.4 MG/DL — SIGNIFICANT CHANGE UP (ref 8.4–10.5)
CHLORIDE SERPL-SCNC: 103 MMOL/L — SIGNIFICANT CHANGE UP (ref 98–107)
CO2 SERPL-SCNC: 22 MMOL/L — SIGNIFICANT CHANGE UP (ref 22–31)
CREAT SERPL-MCNC: 0.64 MG/DL — SIGNIFICANT CHANGE UP (ref 0.5–1.3)
EOSINOPHIL # BLD AUTO: 0.09 K/UL — SIGNIFICANT CHANGE UP (ref 0–0.5)
EOSINOPHIL NFR BLD AUTO: 0.6 % — SIGNIFICANT CHANGE UP (ref 0–6)
FIBRINOGEN PPP-MCNC: 783 MG/DL — HIGH (ref 290–520)
GLUCOSE SERPL-MCNC: 118 MG/DL — HIGH (ref 70–99)
HCT VFR BLD CALC: 32.5 % — LOW (ref 34.5–45)
HGB BLD-MCNC: 10.4 G/DL — LOW (ref 11.5–15.5)
IANC: 11.47 K/UL — HIGH (ref 1.5–8.5)
IMM GRANULOCYTES NFR BLD AUTO: 0.3 % — SIGNIFICANT CHANGE UP (ref 0–1.5)
INR BLD: 0.92 RATIO — SIGNIFICANT CHANGE UP (ref 0.88–1.16)
LDH SERPL L TO P-CCNC: 345 U/L — HIGH (ref 135–225)
LYMPHOCYTES # BLD AUTO: 14.7 % — SIGNIFICANT CHANGE UP (ref 13–44)
LYMPHOCYTES # BLD AUTO: 2.1 K/UL — SIGNIFICANT CHANGE UP (ref 1–3.3)
MCHC RBC-ENTMCNC: 27.2 PG — SIGNIFICANT CHANGE UP (ref 27–34)
MCHC RBC-ENTMCNC: 32 GM/DL — SIGNIFICANT CHANGE UP (ref 32–36)
MCV RBC AUTO: 85.1 FL — SIGNIFICANT CHANGE UP (ref 80–100)
MONOCYTES # BLD AUTO: 0.53 K/UL — SIGNIFICANT CHANGE UP (ref 0–0.9)
MONOCYTES NFR BLD AUTO: 3.7 % — SIGNIFICANT CHANGE UP (ref 2–14)
NEUTROPHILS # BLD AUTO: 11.47 K/UL — HIGH (ref 1.8–7.4)
NEUTROPHILS NFR BLD AUTO: 80.4 % — HIGH (ref 43–77)
NRBC # BLD: 0 /100 WBCS — SIGNIFICANT CHANGE UP
NRBC # FLD: 0 K/UL — SIGNIFICANT CHANGE UP
PLATELET # BLD AUTO: 167 K/UL — SIGNIFICANT CHANGE UP (ref 150–400)
POTASSIUM SERPL-MCNC: 4 MMOL/L — SIGNIFICANT CHANGE UP (ref 3.5–5.3)
POTASSIUM SERPL-SCNC: 4 MMOL/L — SIGNIFICANT CHANGE UP (ref 3.5–5.3)
PROT SERPL-MCNC: 6.1 G/DL — SIGNIFICANT CHANGE UP (ref 6–8.3)
PROTHROM AB SERPL-ACNC: 10.5 SEC — LOW (ref 10.6–13.6)
RBC # BLD: 3.82 M/UL — SIGNIFICANT CHANGE UP (ref 3.8–5.2)
RBC # FLD: 13.7 % — SIGNIFICANT CHANGE UP (ref 10.3–14.5)
SODIUM SERPL-SCNC: 135 MMOL/L — SIGNIFICANT CHANGE UP (ref 135–145)
URATE SERPL-MCNC: 5.1 MG/DL — SIGNIFICANT CHANGE UP (ref 2.5–7)
WBC # BLD: 14.27 K/UL — HIGH (ref 3.8–10.5)
WBC # FLD AUTO: 14.27 K/UL — HIGH (ref 3.8–10.5)

## 2021-01-28 RX ORDER — IBUPROFEN 200 MG
600 TABLET ORAL EVERY 6 HOURS
Refills: 0 | Status: DISCONTINUED | OUTPATIENT
Start: 2021-01-28 | End: 2021-01-29

## 2021-01-28 RX ADMIN — SODIUM CHLORIDE 3 MILLILITER(S): 9 INJECTION INTRAMUSCULAR; INTRAVENOUS; SUBCUTANEOUS at 06:16

## 2021-01-28 RX ADMIN — Medication 600 MILLIGRAM(S): at 11:24

## 2021-01-28 RX ADMIN — SODIUM CHLORIDE 3 MILLILITER(S): 9 INJECTION INTRAMUSCULAR; INTRAVENOUS; SUBCUTANEOUS at 17:23

## 2021-01-28 RX ADMIN — SODIUM CHLORIDE 3 MILLILITER(S): 9 INJECTION INTRAMUSCULAR; INTRAVENOUS; SUBCUTANEOUS at 17:24

## 2021-01-28 RX ADMIN — Medication 600 MILLIGRAM(S): at 05:30

## 2021-01-28 NOTE — PROGRESS NOTE ADULT - SUBJECTIVE AND OBJECTIVE BOX
OB Attending Progress Note:  PPD#1    S: 31yo PPD#1 s/p . Patient feels well. Pain is well controlled. She is tolerating a regular diet and passing flatus. She is voiding spontaneously. and ambulating without difficulty. She is breastfeeding. Denies CP/SOB. Denies lightheadedness/dizziness. Denies N/V/D.    O:  Vitals:  Vital Signs Last 24 Hrs  T(C): 36.5 (2021 18:30), Max: 37.1 (2021 14:00)  T(F): 97.7 (2021 18:30), Max: 98.7 (2021 14:00)  HR: 105 (2021 18:30) (97 - 118)  BP: 137/80 (2021 18:30) (126/69 - 138/87)  BP(mean): --  RR: 16 (2021 18:30) (16 - 18)  SpO2: 99% (2021 18:30) (98% - 100%)    MEDICATIONS  (STANDING):  acetaminophen   Tablet .. 975 milliGRAM(s) Oral <User Schedule>  diphtheria/tetanus/pertussis (acellular) Vaccine (ADAcel) 0.5 milliLiter(s) IntraMuscular once  ibuprofen  Tablet. 600 milliGRAM(s) Oral every 6 hours  prenatal multivitamin 1 Tablet(s) Oral daily  sodium chloride 0.9% lock flush 3 milliLiter(s) IV Push every 8 hours      Labs:  Blood type: B Positive  Rubella IgG: RPR: Negative                          10.4<L>   14.27<H> >-----------< 167    (  @ 07:22 )             32.5<L>                        12.1   12.65<H> >-----------< 146<L>    (  @ 09:51 )             38.7                        12.5   14.17<H> >-----------< 167    (  @ 21:29 )             39.6                        12.3   9.13 >-----------< 168    (  @ 09:18 )             37.4                        11.6   7.95 >-----------< 146<L>    (  @ 00:21 )             36.6    21 @ 07:22      135  |  103  |  9   ----------------------------<  118<H>  4.0   |  22  |  0.64    21 @ 09:51      136  |  100  |  11  ----------------------------<  73  4.1   |  22  |  0.65    21 @ 21:29      137  |  103  |  12  ----------------------------<  63<L>  4.3   |  23  |  0.63    21 @ 09:18      136  |  105  |  10  ----------------------------<  94  3.7   |  19<L>  |  0.59    21 @ 00:21      135  |  103  |  10  ----------------------------<  93  4.4   |  17<L>  |  0.48<L>        Ca    8.4      2021 07:22  Ca    9.3      2021 09:51  Ca    9.0      2021 21:29  Ca    9.1      2021 09:18  Ca    10.0      2021 00:21    TPro  6.1  /  Alb  3.2<L>  /  TBili  0.2  /  DBili  x   /  AST  62<H>  /  ALT  26  /  AlkPhos  147<H>  21 @ 07:22  TPro  6.9  /  Alb  3.3  /  TBili  0.4  /  DBili  x   /  AST  33<H>  /  ALT  19  /  AlkPhos  172<H>  21 @ 09:51  TPro  6.9  /  Alb  3.7  /  TBili  0.2  /  DBili  x   /  AST  28  /  ALT  20  /  AlkPhos  171<H>  21 @ 21:29  TPro  6.7  /  Alb  3.6  /  TBili  <0.2  /  DBili  x   /  AST  25  /  ALT  16  /  AlkPhos  165<H>  21 @ 09:18  TPro  7.1  /  Alb  3.4  /  TBili  <0.2  /  DBili  x   /  AST  35<H>  /  ALT  21  /  AlkPhos  166<H>  21 @ 00:21          Physical Exam:  General: NAD  CV: RR  Pulm: Breathing comfortably on RA  Abdomen: soft, non-tender, non-distended, +BS, fundus firm  Vaginal: Lochia wnl  Extremities: No erythema/edema    A/P: 31yo PPD#1 s/p , c/b gHTN.   - Pain well controlled, continue current pain regimen  - BP monitoring  - Increase ambulation, SCDs when not ambulating  - Continue regular diet  - Discharge planning tomorrrow if BPs stable      Srikanth Badillo MD

## 2021-01-28 NOTE — DISCHARGE NOTE OB - HOME CARE AGENCY TYPE OF SERVICE:
Blood pressure monitoring. Nurse will visit patient the day after discharge. Nurse will telephone patient to schedule visit time.

## 2021-01-28 NOTE — DISCHARGE NOTE OB - CARE PLAN
Principal Discharge DX:	Vaginal delivery  Goal:	normal postpartum course  Assessment and plan of treatment:	normal diet and activity

## 2021-01-28 NOTE — DISCHARGE NOTE OB - PATIENT PORTAL LINK FT
You can access the FollowMyHealth Patient Portal offered by [facility] by registering at the following website: [email or web address]. By joining [employer]’s FollowMyHealth portal, you will also be able to view your health information using other applications (apps) compatible with our system.

## 2021-01-29 VITALS
TEMPERATURE: 99 F | SYSTOLIC BLOOD PRESSURE: 138 MMHG | DIASTOLIC BLOOD PRESSURE: 88 MMHG | HEART RATE: 100 BPM | OXYGEN SATURATION: 100 % | RESPIRATION RATE: 20 BRPM

## 2021-01-29 LAB
C TRACH RRNA SPEC QL NAA+PROBE: SIGNIFICANT CHANGE UP
N GONORRHOEA RRNA SPEC QL NAA+PROBE: SIGNIFICANT CHANGE UP
SPECIMEN SOURCE: SIGNIFICANT CHANGE UP

## 2021-01-29 RX ADMIN — Medication 600 MILLIGRAM(S): at 16:00

## 2021-01-29 RX ADMIN — MAGNESIUM HYDROXIDE 30 MILLILITER(S): 400 TABLET, CHEWABLE ORAL at 12:11

## 2021-01-29 RX ADMIN — Medication 1 TABLET(S): at 07:56

## 2021-01-29 RX ADMIN — Medication 10 MILLIGRAM(S): at 10:43

## 2021-01-29 RX ADMIN — Medication 975 MILLIGRAM(S): at 10:43

## 2021-01-29 RX ADMIN — Medication 600 MILLIGRAM(S): at 07:56

## 2021-01-29 RX ADMIN — Medication 1 ENEMA: at 15:54

## 2021-01-29 RX ADMIN — SODIUM CHLORIDE 3 MILLILITER(S): 9 INJECTION INTRAMUSCULAR; INTRAVENOUS; SUBCUTANEOUS at 05:48

## 2021-01-29 RX ADMIN — SENNA PLUS 1 TABLET(S): 8.6 TABLET ORAL at 06:06

## 2021-01-29 NOTE — PROVIDER CONTACT NOTE (OTHER) - SITUATION
Patient complains of constipation reports last BM 5 days ago
Patient continues to complain of constipation, Fleet enema x1 with no relief. Cid flush x1 ordered.   Patient self reported that she sat on the floor to get some relief from rectal discomfort.
Patient continues to complain of constipation, no relief after Dulcolax suppository given.

## 2021-01-29 NOTE — PROGRESS NOTE ADULT - SUBJECTIVE AND OBJECTIVE BOX
Subjective  Pain: well controlled  Complaints: no BM  Milestones: Alert and orientedx3. Out of bed ambulating. Voiding/Due to void. Tolerating a regular diet.  Infant feeding:  breast                               Feeding related issues or concerns:none  Objective   T(C): 37.1 (01-29-21 @ 05:15), Max: 37.1 (01-28-21 @ 14:00)  HR: 105 (01-28-21 @ 18:30) (101 - 105)  BP: 129/89 (01-29-21 @ 05:15) (129/78 - 137/80)  RR: 17 (01-29-21 @ 05:15) (16 - 18)  SpO2: 99% (01-29-21 @ 05:15) (99% - 99%)  Wt(kg): --      Assessment      29 y/o G3  P 1 .Day # 2 postpartum. Preeclampcia AST/ALT 33/19-> 62/26, no c/o HA, blurred vision, epigastric pain  Assisted delivery (vacuum, forceps):  Indication for assisted delivery:  Past medical history significant fornone  Current Issues: no BM  Breasts: soft  Abdomen: Soft, nontender, nondistended.  Vagina: Lochia moderate  Perineum:  2nd degree laceration,   Laceration/episiotomy site:clean  Lower extremities: 1+ edema noted bilaterally of lower extremities. Nontender calves.  Negative Pedro's sign. Positive pedal pulses.  Other relevant physical exam findings;none      Plan  Plan: Increase ambulation, analgesia PRN and pain medication protocal standing oxycodone, ibuprofen and acetaminophen.  Diet: Continue regular diet  Dulcolax suppository, MOM  Continue routine postpartum care.

## 2021-01-29 NOTE — PROVIDER CONTACT NOTE (OTHER) - ACTION/TREATMENT ORDERED:
Fleet enema x 1 ordered.
Cid flush ordered.  S/p [place] flush patient noted with large bowel movement.
Dulcolox suppository ordered.

## 2021-02-08 LAB — SURGICAL PATHOLOGY STUDY: SIGNIFICANT CHANGE UP

## 2021-05-18 NOTE — OB RN PATIENT PROFILE - IF RESULT GREATER THAN 35 DAYS OLD SELECT STATUS
Interdisciplinary Rounds completed with Nursing, Case Management, Dietician, Pharmacy,  Physician and PT/OT present. Plan of care reviewed and updated.     Consults include PT/OT    Lines/Drains None    Remote tele  Continue    Pending work up    Expected discharge date: TBD     Location: [facility]
N/A

## 2021-05-27 ENCOUNTER — APPOINTMENT (OUTPATIENT)
Dept: OBGYN | Facility: CLINIC | Age: 31
End: 2021-05-27

## 2021-08-09 ENCOUNTER — NON-APPOINTMENT (OUTPATIENT)
Age: 31
End: 2021-08-09

## 2021-08-09 ENCOUNTER — APPOINTMENT (OUTPATIENT)
Dept: OBGYN | Facility: CLINIC | Age: 31
End: 2021-08-09
Payer: MEDICAID

## 2021-08-09 PROCEDURE — 99213 OFFICE O/P EST LOW 20 MIN: CPT | Mod: 25

## 2021-08-09 PROCEDURE — 76801 OB US < 14 WKS SINGLE FETUS: CPT

## 2021-08-14 ENCOUNTER — APPOINTMENT (OUTPATIENT)
Dept: DISASTER EMERGENCY | Facility: CLINIC | Age: 31
End: 2021-08-14

## 2021-08-18 ENCOUNTER — OUTPATIENT (OUTPATIENT)
Dept: OUTPATIENT SERVICES | Facility: HOSPITAL | Age: 31
LOS: 1 days | End: 2021-08-18

## 2021-08-18 VITALS
RESPIRATION RATE: 18 BRPM | HEART RATE: 95 BPM | WEIGHT: 130.95 LBS | DIASTOLIC BLOOD PRESSURE: 75 MMHG | TEMPERATURE: 98 F | OXYGEN SATURATION: 99 % | HEIGHT: 59 IN | SYSTOLIC BLOOD PRESSURE: 103 MMHG

## 2021-08-18 DIAGNOSIS — Z64.0 PROBLEMS RELATED TO UNWANTED PREGNANCY: ICD-10-CM

## 2021-08-18 DIAGNOSIS — O04.80 (INDUCED) TERMINATION OF PREGNANCY WITH UNSPECIFIED COMPLICATIONS: ICD-10-CM

## 2021-08-18 DIAGNOSIS — Z87.59 PERSONAL HISTORY OF OTHER COMPLICATIONS OF PREGNANCY, CHILDBIRTH AND THE PUERPERIUM: Chronic | ICD-10-CM

## 2021-08-18 LAB
ANION GAP SERPL CALC-SCNC: 16 MMOL/L — HIGH (ref 7–14)
BLD GP AB SCN SERPL QL: NEGATIVE — SIGNIFICANT CHANGE UP
BUN SERPL-MCNC: 10 MG/DL — SIGNIFICANT CHANGE UP (ref 7–23)
CALCIUM SERPL-MCNC: 9.5 MG/DL — SIGNIFICANT CHANGE UP (ref 8.4–10.5)
CHLORIDE SERPL-SCNC: 101 MMOL/L — SIGNIFICANT CHANGE UP (ref 98–107)
CO2 SERPL-SCNC: 18 MMOL/L — LOW (ref 22–31)
CREAT SERPL-MCNC: 0.47 MG/DL — LOW (ref 0.5–1.3)
GLUCOSE SERPL-MCNC: 68 MG/DL — LOW (ref 70–99)
HCT VFR BLD CALC: 38 % — SIGNIFICANT CHANGE UP (ref 34.5–45)
HGB BLD-MCNC: 12 G/DL — SIGNIFICANT CHANGE UP (ref 11.5–15.5)
MCHC RBC-ENTMCNC: 25.5 PG — LOW (ref 27–34)
MCHC RBC-ENTMCNC: 31.6 GM/DL — LOW (ref 32–36)
MCV RBC AUTO: 80.9 FL — SIGNIFICANT CHANGE UP (ref 80–100)
NRBC # BLD: 0 /100 WBCS — SIGNIFICANT CHANGE UP
NRBC # FLD: 0 K/UL — SIGNIFICANT CHANGE UP
PLATELET # BLD AUTO: 221 K/UL — SIGNIFICANT CHANGE UP (ref 150–400)
POTASSIUM SERPL-MCNC: 3.8 MMOL/L — SIGNIFICANT CHANGE UP (ref 3.5–5.3)
POTASSIUM SERPL-SCNC: 3.8 MMOL/L — SIGNIFICANT CHANGE UP (ref 3.5–5.3)
RBC # BLD: 4.7 M/UL — SIGNIFICANT CHANGE UP (ref 3.8–5.2)
RBC # FLD: 12.6 % — SIGNIFICANT CHANGE UP (ref 10.3–14.5)
RH IG SCN BLD-IMP: POSITIVE — SIGNIFICANT CHANGE UP
SODIUM SERPL-SCNC: 135 MMOL/L — SIGNIFICANT CHANGE UP (ref 135–145)
WBC # BLD: 9.19 K/UL — SIGNIFICANT CHANGE UP (ref 3.8–10.5)
WBC # FLD AUTO: 9.19 K/UL — SIGNIFICANT CHANGE UP (ref 3.8–10.5)

## 2021-08-18 RX ORDER — SODIUM CHLORIDE 9 MG/ML
1000 INJECTION, SOLUTION INTRAVENOUS
Refills: 0 | Status: DISCONTINUED | OUTPATIENT
Start: 2021-08-24 | End: 2021-09-07

## 2021-08-18 NOTE — H&P PST ADULT - PROBLEM SELECTOR PLAN 1
Pt. is scheduled for a DVC 8/24/21.  Pt. verbalized understanding of instructions.  Pt. to call Surgeon's office to have COVID test scheduled.

## 2021-08-18 NOTE — H&P PST ADULT - NSANTHOSAYNRD_GEN_A_CORE
No. MAC screening performed.  STOP BANG Legend: 0-2 = LOW Risk; 3-4 = INTERMEDIATE Risk; 5-8 = HIGH Risk

## 2021-08-18 NOTE — H&P PST ADULT - RS GEN PE MLT RESP DETAILS PC
----- Message from [place] sent at 7/13/2021 10:08 AM EDT -----  Subject: Message to Provider    QUESTIONS  Information for Provider? patient called said he would be there should be   there by his appointment time at 1045 am but might be a couple minutes   late he is coming from 4 hours away  ---------------------------------------------------------------------------  --------------  [address]  What is the best way for the office to contact you? OK to leave message on   voicemail  Preferred Call Back Phone Number? [phone]  ---------------------------------------------------------------------------  --------------  SCRIPT ANSWERS  Relationship to Patient?  Self
clear to auscultation bilaterally

## 2021-08-18 NOTE — H&P PST ADULT - ATTENDING COMMENTS
GYN attending   P1 presenting for TOP 9weeks by LMP 6/23/21. Plan for DVC  Risks, benefits and alternatives discussed, written informed consent obtained    JERSON Rodas MD

## 2021-08-18 NOTE — H&P PST ADULT - NSICDXPASTMEDICALHX_GEN_ALL_CORE_FT
PAST MEDICAL HISTORY:  PIH (pregnancy induced hypertension)     Spontaneous  2016     PAST MEDICAL HISTORY:  WOLFF (dyspnea on exertion) H/O    PIH (pregnancy induced hypertension)     Spontaneous  2016

## 2021-08-19 PROBLEM — O03.9 COMPLETE OR UNSPECIFIED SPONTANEOUS ABORTION WITHOUT COMPLICATION: Chronic | Status: ACTIVE | Noted: 2021-08-18

## 2021-08-19 PROBLEM — O13.9 GESTATIONAL [PREGNANCY-INDUCED] HYPERTENSION WITHOUT SIGNIFICANT PROTEINURIA, UNSPECIFIED TRIMESTER: Chronic | Status: ACTIVE | Noted: 2021-08-18

## 2021-08-21 ENCOUNTER — APPOINTMENT (OUTPATIENT)
Dept: DISASTER EMERGENCY | Facility: CLINIC | Age: 31
End: 2021-08-21

## 2021-08-21 DIAGNOSIS — Z01.818 ENCOUNTER FOR OTHER PREPROCEDURAL EXAMINATION: ICD-10-CM

## 2021-08-22 LAB — SARS-COV-2 N GENE NPH QL NAA+PROBE: NOT DETECTED

## 2021-08-23 ENCOUNTER — TRANSCRIPTION ENCOUNTER (OUTPATIENT)
Age: 31
End: 2021-08-23

## 2021-08-23 NOTE — ASU PATIENT PROFILE, ADULT - HISTORY OF COVID-19 VACCINATION
MICU Progress Note    Subjective:  Not responsive, trach and PEG in place.  Contractures of limbs.    Medications:  Current Facility-Administered Medications   Medication Dose Route Frequency Provider Last Rate Last Dose   • rifAXIMin (XIFAXAN) tablet 200 mg  200 mg Per G Tube TID Tere Coello CNP   200 mg at 07/05/20 2018   • sodium chloride 0.9 % flush bag 25 mL  25 mL Intravenous PRN Gege Gibbs, DO       • sodium chloride (PF) 0.9 % injection 2 mL  2 mL Intracatheter 2 times per day Gege Castellanosai, DO   2 mL at 07/05/20 2021   • sodium chloride 0.9% infusion   Intravenous Continuous PRN Gege Gibbs, DO       • sodium chloride 0.9% infusion   Intravenous Continuous PRN Gege Gibbs, DO       • sodium chloride (NORMAL SALINE) 0.9 % bolus 500 mL  500 mL Intravenous PRN Gege Gibbs, DO       • enoxaparin (LOVENOX) injection 30 mg  30 mg Subcutaneous Daily Gege Gibbs, DO   30 mg at 07/05/20 1029   • amylase-lipase-protease 30,000-6,000-19,000 units (CREON) per capsule 1 capsule  1 capsule Per G Tube TID  Tere Coello CNP   1 capsule at 07/05/20 1728   • ascorbic acid (VITAMIN C) tablet 500 mg  500 mg Per G Tube Daily Tere Coello CNP   500 mg at 07/05/20 0904   • baclofen (LIORESAL) tablet 15 mg  15 mg Per G Tube TID Tere Coello CNP   15 mg at 07/05/20 2018   • cyanocobalamin (Vitamin B-12) tablet 250 mcg  250 mcg Per G Tube Daily Tere Coello CNP   250 mcg at 07/05/20 0903   • folic acid (FOLATE) tablet 0.5 mg  0.5 mg Per G Tube Daily Tere Coello CNP   0.5 mg at 07/05/20 0904   • gabapentin (NEURONTIN) capsule 100 mg  100 mg Per G Tube BID Tere Coello CNP   100 mg at 07/05/20 2018   • levETIRAcetam (KepPRA) 100 MG/ML oral solution 500 mg  500 mg Per G Tube BID Tere Coello CNP   500 mg at 07/05/20 2018   • metoPROLOL tartrate (LOPRESSOR) tablet 12.5 mg  12.5 mg Per G Tube 2 times per day Tere Coello, CNP   12.5 mg at 07/05/20 2018   • oxyCODONE (IMM REL) (ROXICODONE) tablet 2.5 mg   2.5 mg Per G Tube Q6H PRN Tere ANGULO Mode CNP   2.5 mg at 07/05/20 1556   • zinc sulfate (MAR-ZINC) tablet 220 mg  220 mg Per G Tube Daily Tere KEV Mode CNP   220 mg at 07/05/20 0903   • albuterol inhaler 2 puff  2 puff Inhalation Q4H Resp PRN Terematthew Coello CNP       • sodium chloride 0.9% infusion   Intravenous Continuous Lei Parra, DO 40 mL/hr at 07/06/20 0659     • pantoprazole (PROTONIX) 40 MG/20ML (compounded) suspension 40 mg  40 mg Per G Tube Daily Tere ANGULO Mode CNP   40 mg at 07/05/20 0922   • ferrous sulfate 300 (60 Fe) MG/5ML liquid 300 mg  300 mg Per G Tube Daily with breakfast Tere KEV Mode CNP   300 mg at 07/05/20 0903   • lactobacillus acidophilus (BACID) tablet 1 tablet  1 tablet Per G Tube BID Terematthew Coello CNP   1 tablet at 07/05/20 2018   • lacosamide 10 MG/ML oral solution 200 mg  200 mg Per G Tube 2 times per day Tere ANGULO Mode CNP   200 mg at 07/05/20 2020   • acetaminophen (TYLENOL) 160 MG/5ML solution 500 mg  500 mg Per G Tube Q6H PRN Gege Gibbs, DO   500 mg at 07/06/20 0102   • cefepime (MAXIPIME) 1 g in sodium chloride 0.9 % 100 mL IVPB  1 g Intravenous 3 times per day Gege Gibbs, DO   Stopped at 07/06/20 0558        Review of Systems:  Review of Systems   Constitutional:        Unable       Objective:  I/O last 3 completed shifts:  In: 2616.8 [I.V.:881.8; NG/GT:1535; IV Piggyback:200]  Out: 3 [Urine:2; Stool:1]  No intake/output data recorded.    Vital Last Value 24 Hour Range   Temperature 97.2 °F (36.2 °C) (07/06/20 0400) Temp  Min: 97.2 °F (36.2 °C)  Max: 98.2 °F (36.8 °C)   Pulse 88 (07/06/20 0700) Pulse  Min: 75  Max: 120   Respiratory (!) 28 (07/06/20 0700) Resp  Min: 12  Max: 28   Non-Invasive  Blood Pressure 120/81 (07/06/20 0700) BP  Min: 101/74  Max: 145/99   Pulse Oximetry 99 % (07/06/20 0700) SpO2  Min: 95 %  Max: 100 %   Arterial   Blood Pressure   No data recorded        Physical Exam  Constitutional:       Appearance: She is cachectic.      Comments:  Unresponsive, contractured   HENT:      Mouth/Throat:      Mouth: Mucous membranes are dry.   Neck:      Trachea: Tracheostomy present.   Cardiovascular:      Rate and Rhythm: Normal rate and regular rhythm.   Pulmonary:      Breath sounds: Rhonchi present.   Abdominal:      Palpations: Abdomen is soft.      Comments: PEG tube   Musculoskeletal:      Comments: Contractures of all extremities.         Labs:  Recent Results (from the past 24 hour(s))   CBC with Automated Differential    Collection Time: 07/06/20  4:20 AM   Result Value Ref Range    WBC 4.8 4.2 - 11.0 K/mcL    RBC 2.19 (L) 4.00 - 5.20 mil/mcL    HGB 7.6 (L) 12.0 - 15.5 g/dL    HCT 23.2 (L) 36.0 - 46.5 %    .9 (H) 78.0 - 100.0 fl    MCH 34.7 (H) 26.0 - 34.0 pg    MCHC 32.8 32.0 - 36.5 g/dL    RDW-CV 11.8 11.0 - 15.0 %     (L) 140 - 450 K/mcL    NRBC 0 0 /100 WBC    DIFF TYPE AUTOMATED DIFFERENTIAL     Neutrophil 64 %    LYMPH 21 %    MONO 10 %    EOSIN 5 %    BASO 0 %    Percent Immature Granuloctyes 0 %    Absolute Neutrophil 3.0 1.8 - 7.7 K/mcL    Absolute Lymph 1.0 1.0 - 4.8 K/mcL    Absolute Mono 0.5 0.3 - 0.9 K/mcL    Absolute Eos 0.2 0.1 - 0.5 K/mcL    Absolute Baso 0.0 0.0 - 0.3 K/mcL    Absolute Immature Granulocytes 0.0 0 - 0.2 K/mcl       Imaging:  XR CHEST PA OR AP 1 VIEW   Final Result   Tracheostomy tube present.  Heart size is normal.  Lungs are hyperinflated as previously.  Small pleural effusions cannot be excluded.  Lungs are otherwise clear.      Electronically Signed by: BECKA PALOMARES M.D.    Signed on: 7/3/2020 10:47 AM              Ventilator    Vent Settings Last Value   FiO2 30 % (07/06/20 0700)   Mode Volume A/C (07/06/20 0451)   Rate 12 (07/06/20 0700)   Tidal Volume 300 mL (07/06/20 0700)   Pressure Support     PEEP/CPAC/EPAP 5 cm H20 (07/06/20 0700)   7/3/2020 chest x-ray scoliosis, bronchiectasis behind the heart    1.  Acute and chronic hypoxemic respiratory failure  2.  Volume depletion resolved  3.   Proteus UTI  4.  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  Anemia  7.  Seizure disorder    1.  Ventilatory support  2.  Cefepime  3.  Tube feedings        Checklist:  - VTE prophylaxis: Enoxaparin subcu  - GI prophylaxis: Pantoprazole  - Nutrition: Tube feedings  - Therapy/mobilization: Bedrest  - Goals of care: Full code  - Replete electrolytes frequently    Critical Care Attestation   Ms. Gutierres is critically ill as documented above. I saw and evaluated the patient and reviewed imaging and laboratory data. Critical care time: 32 minutes not including time allocated for procedures.    Sigrid Lopez MD  [facility]   Yes

## 2021-08-24 ENCOUNTER — RESULT REVIEW (OUTPATIENT)
Age: 31
End: 2021-08-24

## 2021-08-24 ENCOUNTER — OUTPATIENT (OUTPATIENT)
Dept: OUTPATIENT SERVICES | Facility: HOSPITAL | Age: 31
LOS: 1 days | Discharge: HOME | End: 2021-08-24
Payer: MEDICAID

## 2021-08-24 VITALS
SYSTOLIC BLOOD PRESSURE: 113 MMHG | RESPIRATION RATE: 18 BRPM | HEART RATE: 84 BPM | DIASTOLIC BLOOD PRESSURE: 68 MMHG | OXYGEN SATURATION: 100 %

## 2021-08-24 VITALS
TEMPERATURE: 98 F | SYSTOLIC BLOOD PRESSURE: 119 MMHG | HEART RATE: 91 BPM | RESPIRATION RATE: 12 BRPM | DIASTOLIC BLOOD PRESSURE: 83 MMHG | HEIGHT: 59 IN | WEIGHT: 130.95 LBS | OXYGEN SATURATION: 100 %

## 2021-08-24 DIAGNOSIS — O04.80 (INDUCED) TERMINATION OF PREGNANCY WITH UNSPECIFIED COMPLICATIONS: ICD-10-CM

## 2021-08-24 DIAGNOSIS — Z87.59 PERSONAL HISTORY OF OTHER COMPLICATIONS OF PREGNANCY, CHILDBIRTH AND THE PUERPERIUM: Chronic | ICD-10-CM

## 2021-08-24 PROBLEM — R06.00 DYSPNEA, UNSPECIFIED: Chronic | Status: ACTIVE | Noted: 2021-08-18

## 2021-08-24 LAB
BLD GP AB SCN SERPL QL: NEGATIVE — SIGNIFICANT CHANGE UP
RH IG SCN BLD-IMP: POSITIVE — SIGNIFICANT CHANGE UP

## 2021-08-24 PROCEDURE — 59840 INDUCED ABORTION D&C: CPT

## 2021-08-24 PROCEDURE — 88305 TISSUE EXAM BY PATHOLOGIST: CPT | Mod: 26

## 2021-08-24 PROCEDURE — 88342 IMHCHEM/IMCYTCHM 1ST ANTB: CPT | Mod: 26

## 2021-08-24 RX ORDER — IBUPROFEN 200 MG
1 TABLET ORAL
Qty: 0 | Refills: 0 | DISCHARGE

## 2021-08-24 RX ORDER — ACETAMINOPHEN 500 MG
3 TABLET ORAL
Qty: 0 | Refills: 0 | DISCHARGE

## 2021-08-24 RX ORDER — FAMOTIDINE 10 MG/ML
1 INJECTION INTRAVENOUS
Qty: 0 | Refills: 0 | DISCHARGE

## 2021-08-24 NOTE — BRIEF OPERATIVE NOTE - NSICDXBRIEFPOSTOP_GEN_ALL_CORE_FT
POST-OP DIAGNOSIS:  Missed  24-Aug-2021 11:15:47  Melania Hamilton   POST-OP DIAGNOSIS:  Viable pregnancy 03-Sep-2021 16:28:21  Sample-Maryellen Cid

## 2021-08-24 NOTE — ASU DISCHARGE PLAN (ADULT/PEDIATRIC) - CARE PROVIDER_API CALL
Maryellen Ram; MPH)  [facility]  [address], [address]  [place] [address]  Phone: [phone]  Fax: [phone]  Follow Up Time: 2 weeks

## 2021-08-24 NOTE — ASU DISCHARGE PLAN (ADULT/PEDIATRIC) - ASU DC SPECIAL INSTRUCTIONSFT
After your surgery it is normal to experience:    •	Vaginal bleeding- can last 1-2 weeks and should not be heavier than a period. It may come and go and be red, brown or pink. Use pads, not tampons.  •	Cramping- Is common especially within the first 24 hours. This should be relieved by taking over the counter motrin, advil or Tylenol.  •	Vaginal soreness/irritation- can occur in the first few days after surgery because of the instruments that were used in the vagina. Soreness can be treated with ice pack and irritation can be taken care of with an emollient such as balmex or aquaphor that you can put directly on the irritated area.    Restrictions: For 10-14 days after the surgery you should avoid the following:    •	Tampons  •	Sex  •	Vigorous gym exercise  •	Swimming  pools and tub baths  •	Wait a day or two before going back to work    Anesthesia Precautions:  For the next 12 hours do not:   •	drive a car,  •	 operate power tools or machinery,  •	drink alcohol, beer, or wine,   •	make important personal or business decisions    Diet:   •	Resume Regular diet but Progress diet slowly     Physician Notification- Warning signs to look out for  •	Heavy Vaginal Bleeding   •	Shortness of breath or chest pain  •	Severe Abdominal Pain  •	Persistent nausea and vomiting  •	Pain not relieved by medications  •	Fever greater than 100.5®F  •	Inability to tolerate liquids or foods  •	Unable to urinate after 8 hours

## 2021-08-24 NOTE — ASU DISCHARGE PLAN (ADULT/PEDIATRIC) - ACTIVITY LEVEL
No excercise/No heavy lifting/Weight bearing as tolerated/Nothing per vagina/No tub baths/No douching/No tampons/No intercourse

## 2021-08-24 NOTE — BRIEF OPERATIVE NOTE - OPERATION/FINDINGS
Findings: Exam under anesthesia revealed an anteverted uterus approximately 9 weeks in size. Cervix dilated to 9mm

## 2021-08-24 NOTE — BRIEF OPERATIVE NOTE - NSICDXBRIEFPREOP_GEN_ALL_CORE_FT
PRE-OP DIAGNOSIS:  Missed  24-Aug-2021 11:15:34  Melania Hamilton   PRE-OP DIAGNOSIS:  Viable pregnancy 03-Sep-2021 16:28:09  Sample-Maryellen Cid

## 2021-08-26 ENCOUNTER — RESULT REVIEW (OUTPATIENT)
Age: 31
End: 2021-08-26

## 2021-08-26 LAB — SURGICAL PATHOLOGY STUDY: SIGNIFICANT CHANGE UP

## 2021-09-01 DIAGNOSIS — Z78.9 OTHER SPECIFIED HEALTH STATUS: ICD-10-CM

## 2021-09-01 DIAGNOSIS — Z86.19 PERSONAL HISTORY OF OTHER INFECTIOUS AND PARASITIC DISEASES: ICD-10-CM

## 2021-09-01 RX ORDER — FAMOTIDINE 20 MG/1
20 TABLET, FILM COATED ORAL
Refills: 0 | Status: ACTIVE | COMMUNITY

## 2021-09-09 ENCOUNTER — APPOINTMENT (OUTPATIENT)
Dept: OBGYN | Facility: CLINIC | Age: 31
End: 2021-09-09
Payer: MEDICAID

## 2021-09-09 ENCOUNTER — APPOINTMENT (OUTPATIENT)
Dept: ANTEPARTUM | Facility: CLINIC | Age: 31
End: 2021-09-09
Payer: MEDICAID

## 2021-09-09 VITALS
BODY MASS INDEX: 26.41 KG/M2 | HEIGHT: 59 IN | WEIGHT: 131 LBS | SYSTOLIC BLOOD PRESSURE: 110 MMHG | DIASTOLIC BLOOD PRESSURE: 74 MMHG

## 2021-09-09 DIAGNOSIS — Z09 ENCOUNTER FOR FOLLOW-UP EXAMINATION AFTER COMPLETED TREATMENT FOR CONDITIONS OTHER THAN MALIGNANT NEOPLASM: ICD-10-CM

## 2021-09-09 PROCEDURE — 99024 POSTOP FOLLOW-UP VISIT: CPT

## 2021-09-09 PROCEDURE — 76830 TRANSVAGINAL US NON-OB: CPT

## 2021-09-09 PROCEDURE — 76857 US EXAM PELVIC LIMITED: CPT

## 2021-09-18 NOTE — HISTORY OF PRESENT ILLNESS
[None] : no vaginal bleeding [Normal] : normal [de-identified] : Patient is a 30 y/o F s/p DVC for \[facility] [place] [facility]\"" 8/24. Patient is feeling well today. Pain well controlled, ambulating, voiding, tolerating PO. Denies subjective fevers and chills. \par Pathology Reviewed: products of conception [de-identified] : No pain

## 2021-09-20 ENCOUNTER — APPOINTMENT (OUTPATIENT)
Dept: OBGYN | Facility: CLINIC | Age: 31
End: 2021-09-20
Payer: MEDICAID

## 2021-09-20 VITALS — WEIGHT: 129 LBS | BODY MASS INDEX: 26.05 KG/M2 | SYSTOLIC BLOOD PRESSURE: 110 MMHG | DIASTOLIC BLOOD PRESSURE: 76 MMHG

## 2021-09-20 DIAGNOSIS — Z30.430 ENCOUNTER FOR INSERTION OF INTRAUTERINE CONTRACEPTIVE DEVICE: ICD-10-CM

## 2021-09-20 PROCEDURE — 58300 INSERT INTRAUTERINE DEVICE: CPT

## 2021-09-20 NOTE — PLAN
[FreeTextEntry1] : 30 y/o F presenting for IUD insertion\par -IUD inserted without complication (please see procedure note for full details)\par -Patient advised to use back up contraception method x7 days\par -Patient given a User Card with instructions to follow up as needed and to have the device removed in 5 years\par -f/u 4 weeks for IUD sono

## 2021-10-25 ENCOUNTER — APPOINTMENT (OUTPATIENT)
Dept: ANTEPARTUM | Facility: CLINIC | Age: 31
End: 2021-10-25

## 2021-10-25 ENCOUNTER — APPOINTMENT (OUTPATIENT)
Dept: OBGYN | Facility: CLINIC | Age: 31
End: 2021-10-25

## 2022-08-31 NOTE — OB PROVIDER H&P - NS_FETALANOMAL_OBGYN_ALL_OB
PRE-OP DIAGNOSIS:  Left renal stone 31-Aug-2022 14:52:59  Antwon Rivers  Left ureteral stone 31-Aug-2022 14:53:07  Antwon Rivers  Acute pyonephrosis 31-Aug-2022 14:53:16  Antwon Rivers  
No

## 2022-12-27 NOTE — ASU PREOP CHECKLIST - NS PREOP CHK INSULIN PUMP THERAPY
[FreeTextEntry1] : \par #1. Continue autoCPAP to treat mild JIM with an AHI of 11.9; The patient is using and benefitting from CPAP therapy and encouraged continued compliance\par #2. Diet and exercise for weight loss given mild recent weight gain\par #3. F/u in 6 months with compliance\par #4. Pt had both Covid vaccines, booster, flu vaccines\par #5. Replace equipment as needed; ordered 12/27/22\par #6. Reviewed risks of exposure and symptoms of Covid-19 virus, including how the virus is spread and precautions to avoid aureliano virus.\par \par The patient expressed understanding and agreement with the above recommendations/plan and accepts responsibility to be compliant with recommended testing, therapies, and f/u visits.\par All relevant questions and concerns were addressed.  n/a

## 2023-05-09 NOTE — OB RN PATIENT PROFILE - NS PRO PT RIGHT SUPPORT PERSON
Left message to inform patient of her lab results and reviewed when to start doxy and when to come to the clinic for the biopsy.  Encouraged to call with questions/concerns.  
same name as above

## 2023-10-20 NOTE — OB PROVIDER H&P - NSHPPHYSICALEXAM_GEN_ALL_CORE
Per Pharm: could open the 30 mg cap and mix with 30mL of applesauce (not pudding). Will contact facility to see if she will take applesauce.       Resident needing to have medications crushed and Duloxetine, Fenofibrate unable to be crushed.    Plan:  -discontinue fenofibrate. Higher dose of statin started on 9/21/23. Will recheck lipid panel in 6 weeks since discontinuing fenofibrate    -Will need prior auth to change from duloxetine to sprinkle form (Drizalma). In the meantime continue to attempt administration of capsule in applesauce or like    JACKI Luther CNP on 10/20/2023 at 9:19 AM        Recent Labs   Lab Test 09/21/23  0810 02/17/22  0751   CHOL 223* 215*   HDL 52 68   * 123*   TRIG 179* 119        Objective  Vital signs    – PE:   CV: RRR  Pulm: breathing comfortably on RA  Abd: gravid, nontender  Extr: moving all extremities with ease  – FS:   – Spec: pooling, nitrazine, ferning, bleeding,  (lesions if patient with HSV2 history)  – VE: //  – FHT: baseline 1, mod variability, +accels, -decels  – [place]: qmin  – EFW: _g by sono  – Sono: vertex Objective  Vital signs  Vital Signs Last 24 Hours  T(C): 36.7 (01-25-21 @ 23:00), Max: 36.9 (01-25-21 @ 20:25)  HR: 102 (01-26-21 @ 01:25) (96 - 111)  BP: 115/70 (01-26-21 @ 01:25) (104/58 - 156/88)  RR: 14 (01-25-21 @ 22:38) (14 - 18)  SpO2: 100% (01-25-21 @ 20:25) (100% - 100%)    – PE:   CV: RRR  Pulm: breathing comfortably on RA  Abd: gravid, nontender  – VE: 0/0/-3  – FHT: baseline 140, mod variability, +accels, -decels  – [place]: none   – EFW: 3200g by sono  – Sono: vertex

## 2023-11-06 NOTE — H&P PST ADULT - NSICDXFAMILYHX_GEN_ALL_CORE_FT
FAMILY HISTORY:  Father  Still living? Yes, Estimated age: Age Unknown  FH: diabetes mellitus, Age at diagnosis: Age Unknown    Mother  Still living? No  FH: HTN (hypertension), Age at diagnosis: Age Unknown     no...

## 2023-12-04 NOTE — OB PROVIDER H&P - NS_FORCEPSDELIVERY_OBGYN_ALL_OB_NU
"Subjective   Prema Hair is a 57 y.o. female who presents for evaluation of her Right knee pain.  The pain is located in the deep right knee.  Also has left knee pain but right is much more bothersome.  She has a history of bilateral knee OA and hx of Right knee complex lateral meniscus tear.  Per patient- sports med referred her to our practice for pain management in the meantime while she works on losing some weight before moving forwards with surgery.      She was last seen by us in October 2023 for this pain and we performed steroid injection to the right knee at that visit.  She reports the injection helped a little bit with her pain for 1 day.  Since our last visit she reports she has fallen 6 times due to either her Left or Right knee \"giving out\" randomly.  Describes her pain as constant deep ache and \"crunchy\", worse in Right knee.  Dr. Crow performed Right knee RFA in the past which helped her knee pain for a little over 1 year, she is interested in repeating the RFA.    Per patient and her daughter- in September 2023 she was also seen by neurology for resting tremors and shuffling gait and diagnosed with parkinsonism secondary to anti-psychotics she takes for schizoaffective disorder vs. possibly underlying Parkinson's disease. She was started on low dose carbidopa/levodopa which she thinks is the new medication that made her start to feel \"foggy\" and affected her balance.  She thinks this played a role in her recent falls and has since stopped taking this medicine. She has follow up with Dr. Adorno neurology soon.  Uses a Rolator and cane to ambulate.   We referred her for water therapy at last visit but she did not go due to concerns over her balance and falls at the time, she wants to go and wants another referral for water therapy.     Currently she takes gabapentin 900 mg TID, meloxicam 15 mg daily, diclofenac topical gel, and lidocaine patches which all help.      Imaging:  MRI RIGHT knee " (9/14/2023)  Xray bilateral knees (8/14/2020)      Past Medical History:   Diagnosis Date    Cervicalgia 10/09/2020    Neck pain    Dysphonia 07/05/2022    Muscle tension dysphonia    Encounter for fitting and adjustment of other specified devices 02/16/2021    Fitting and adjustment of pessary    Hyperlipidemia, unspecified 01/03/2023    Dyslipidemia    Hypokalemia 01/20/2022    Hypokalemia    Low back pain, unspecified 02/22/2021    Low back pain    Obstructive sleep apnea (adult) (pediatric) 01/03/2023    ZEINA on CPAP    Paralysis of vocal cords and larynx, unspecified 10/07/2022    Laryngeal paresis    Type 2 diabetes mellitus with diabetic neuropathy, unspecified (CMS/[facility]) 10/09/2020    Controlled type 2 diabetes mellitus with neuropathy    Type 2 diabetes mellitus with other specified complication (CMS/HCC) 01/03/2023    DM type 2 with diabetic mixed hyperlipidemia    Type 2 diabetes mellitus with other specified complication (CMS/HCC) 09/14/2021    Diabetes mellitus type 2 in obese     Past Surgical History:   Procedure Laterality Date    OTHER SURGICAL HISTORY  10/09/2020    Hemithyroidectomy    US GUIDED THYROID BIOPSY  11/19/2020    US GUIDED THYROID BIOPSY 11/19/2020 [facility] LEGAC[facility]    US GUIDED THYROID BIOPSY  11/19/2020    US GUIDED THYROID BIOPSY 11/19/2020 [facility]       I have reviewed the nurses notes and am aware of family/social history.     Review of Systems   Constitutional: Negative.    Respiratory: Negative.     Cardiovascular: Negative.    Gastrointestinal: Negative.    Genitourinary: Negative.    Musculoskeletal:  Positive for arthralgias and gait problem.   Neurological:  Positive for tremors and weakness. Negative for syncope.         Objective   Physical Exam  Constitutional:       Appearance: Normal appearance. She is obese.   HENT:      Mouth/Throat:      Mouth: Mucous membranes are moist.   Eyes:      Conjunctiva/sclera: Conjunctivae normal.   Pulmonary:      Effort: Pulmonary  effort is normal.   Musculoskeletal:      Right knee: Crepitus present. No swelling, deformity, effusion or erythema. Normal range of motion. Tenderness present.      Left knee: No swelling, deformity, effusion, erythema or crepitus. Normal range of motion. Tenderness present.   Skin:     General: Skin is warm and dry.   Neurological:      Mental Status: She is alert and oriented to person, place, and time. Mental status is at baseline.   Psychiatric:         Mood and Affect: Mood normal.         Behavior: Behavior normal.         ASSESSMENT/PLAN:     Bilateral knee osteoarthritis R>L   Right complex lateral meniscus tear     - we will schedule for RIGHT knee genicular nerves RFA.  She has had similar procedures in the past with some relief.  This would not preclude her from undergoing knee replacement.  The patient has already lost about 40 pounds of weight and will follow-up with orthopedics soon.  - we discussed with patient she should be reevaluated by Dr. Clarke Sports medicine to discuss surgical options as the patient reports she is down from 260 lbs to 225 lbs and knee replacement will likely be the long term solution.  We also discussed she may need MRI of her LEFT knee as we only have Right knee MRI and last b/l knee xrays appear to be from 2020   - We again will refer the patient for water therapy: A referral for pool based therapy was provided to the patient. We discussed initiating physical therapy to help manage the patient's painful condition. The patient was counseled that muscle strengthening will improve the long term prognosis in regards to pain and may also help increase range of motion and mobility. The patient's questions were answered and she was agreeable to this course.  -The patient will continue to follow-up with Dr. Adorno from neurology      Discussed treatment plan with patient and her daughter, and attending Dr. Crow.   Call or return to clinic prn if these symptoms worsen or fail  to improve as anticipated.     Vanessa Sandoval, DO     0

## 2024-11-19 NOTE — OB RN DELIVERY SUMMARY - BABY A: APGAR 1 MIN REFLEX IRRITABILITY, DELIVERY
(2) cough or sneeze
Airway : no airway abnormalities , denies prior anesthesia complications   Mallampati : II  Denies loose teeth / Dentures full upper     Deion abrasion risk : Denies

## 2025-02-05 NOTE — H&P PST ADULT - NS PRO PASSIVE SMOKE EXP
Home Care is calling regarding an established patient with [facility].         2/5/2025     4:15 PM   Home Care Information    Name/Phone Number Antonio [phone]   Home Care agency [facility]      Requesting orders from: No Ref-Primary, Physician  Provider is following patient: Yes  Is this a 60-day recertification request?  No    Orders Requested    Physical Therapy  Request for initial certification (first set of orders)   Strengthening, balance  Frequency:  1x/wk for 6 wks  X1 week every other week x2 weeks      Information was gathered and will be sent to provider for review.  RN will contact Home Care with information after provider review.  Confirmed ok to leave a detailed message with call back.  Contact information confirmed and updated as needed.    Aga Chatman RN     Yes...

## 2025-06-01 ENCOUNTER — EMERGENCY (EMERGENCY)
Age: 35
LOS: 1 days | End: 2025-06-01
Attending: EMERGENCY MEDICINE | Admitting: EMERGENCY MEDICINE
Payer: MEDICAID

## 2025-06-01 VITALS
RESPIRATION RATE: 18 BRPM | OXYGEN SATURATION: 98 % | TEMPERATURE: 98 F | DIASTOLIC BLOOD PRESSURE: 88 MMHG | WEIGHT: 130.07 LBS | HEART RATE: 107 BPM | SYSTOLIC BLOOD PRESSURE: 138 MMHG

## 2025-06-01 DIAGNOSIS — Z87.59 PERSONAL HISTORY OF OTHER COMPLICATIONS OF PREGNANCY, CHILDBIRTH AND THE PUERPERIUM: Chronic | ICD-10-CM

## 2025-06-01 LAB
FLUAV AG NPH QL: SIGNIFICANT CHANGE UP
FLUBV AG NPH QL: SIGNIFICANT CHANGE UP
RSV RNA NPH QL NAA+NON-PROBE: SIGNIFICANT CHANGE UP
SARS-COV-2 RNA SPEC QL NAA+PROBE: SIGNIFICANT CHANGE UP
SOURCE RESPIRATORY: SIGNIFICANT CHANGE UP

## 2025-06-01 PROCEDURE — 99284 EMERGENCY DEPT VISIT MOD MDM: CPT

## 2025-06-01 PROCEDURE — 71046 X-RAY EXAM CHEST 2 VIEWS: CPT | Mod: 26

## 2025-06-01 RX ORDER — BENZONATATE 100 MG
200 CAPSULE ORAL ONCE
Refills: 0 | Status: COMPLETED | OUTPATIENT
Start: 2025-06-01 | End: 2025-06-01

## 2025-06-01 RX ORDER — ACETAMINOPHEN 500 MG/5ML
975 LIQUID (ML) ORAL ONCE
Refills: 0 | Status: COMPLETED | OUTPATIENT
Start: 2025-06-01 | End: 2025-06-01

## 2025-06-01 RX ORDER — IBUPROFEN 200 MG
600 TABLET ORAL ONCE
Refills: 0 | Status: COMPLETED | OUTPATIENT
Start: 2025-06-01 | End: 2025-06-01

## 2025-06-01 RX ORDER — BENZONATATE 100 MG
1 CAPSULE ORAL
Qty: 15 | Refills: 0
Start: 2025-06-01 | End: 2025-06-05

## 2025-06-01 RX ORDER — ALBUTEROL SULFATE 2.5 MG/3ML
2 VIAL, NEBULIZER (ML) INHALATION ONCE
Refills: 0 | Status: COMPLETED | OUTPATIENT
Start: 2025-06-01 | End: 2025-06-01

## 2025-06-01 RX ADMIN — Medication 600 MILLIGRAM(S): at 18:40

## 2025-06-01 RX ADMIN — Medication 200 MILLIGRAM(S): at 18:40

## 2025-06-01 RX ADMIN — Medication 975 MILLIGRAM(S): at 18:40

## 2025-06-01 RX ADMIN — Medication 2 PUFF(S): at 18:40

## 2025-06-01 NOTE — ED STATDOCS - OBJECTIVE STATEMENT
34 yo F BIB with 3 yo son by EMS for intermittent cough , fever x 2 weeks. no recent travel. Son also with R ear pain and sore throat.    No pmhx, no meds, NKA,     I performed a medical screening examination and determined this patient to be medically stable and will transfer to the [facility] adult ED for further care. heart and lung exam done and both did not reveal concerns for immediate intervention.

## 2025-06-01 NOTE — ED PROVIDER NOTE - NSICDXPASTMEDICALHX_GEN_ALL_CORE_FT
PAST MEDICAL HISTORY:  WOLFF (dyspnea on exertion) H/O    PIH (pregnancy induced hypertension)     Spontaneous  2016

## 2025-06-01 NOTE — ED ADULT TRIAGE NOTE - CHIEF COMPLAINT QUOTE
cough/intermittent fever x 5/15. took cough syrup PTA. denies pmhx, no surgical hx, nkda c/o cough worsening over last couple of days. also endorsing fevers. TMAX 101. denies any pertinent past medical history

## 2025-06-01 NOTE — ED PROVIDER NOTE - PROGRESS NOTE DETAILS
Roslaie PGY3: Pt was reassessed and is doing well. Results, including any incidental findings, were discussed. Follow up and return precautions were discussed. Patient verbalized understanding

## 2025-06-01 NOTE — ED PROVIDER NOTE - NSFOLLOWUPINSTRUCTIONS_ED_ALL_ED_FT
Viral Illness, Adult  Viruses are tiny germs that can get into a person's body and cause illness. There are many different types of viruses. And they cause many types of illness. Viral illnesses can range from mild to severe. They can affect various parts of the body.    Short-term conditions that are caused by a virus include colds and flu (influenza) and stomach viruses. Long-term conditions that are caused by a virus include herpes, shingles, and human immunodeficiency virus (HIV) infection. A few viruses have been linked to certain cancers.    What are the causes?  Many types of viruses can cause illness. Viruses get into cells in your body, multiply, and cause the infected cells to work differently or die. When these cells die, they release more of the virus. When this happens, you get symptoms of the illness and the virus spreads to other cells. If the virus takes over how the cell works, it can cause the cell to divide and grow out of control. This happens when a virus causes cancer.    Different viruses get into the body in different ways. You can get a virus by:  Swallowing food or water that has come in contact with the virus.  Breathing in droplets that have been coughed or sneezed into the air by an infected person.  Touching a surface that has the virus on it and then touching your eyes, nose, or mouth.  Being bitten by an insect or animal that carries the virus.  Having sexual contact with a person who is infected with the virus.  Being exposed to blood or fluids that contain the virus, either through an open cut or during a transfusion.  If a virus enters your body, your body's disease-fighting system (immune system) will try to fight the virus. You may be at higher risk for a viral illness if your immune system is weak.    What are the signs or symptoms?  Symptoms depend on the type of virus and the location of the cells that it gets into. Symptoms can include:  For cold and flu viruses:  Fever.  Headache.  Sore throat.  Muscle aches.  Stuffy nose (nasal congestion).  Cough.  For stomach (gastrointestinal) viruses:  Fever.  Pain in the abdomen.  Nausea or vomiting.  Diarrhea.  For liver viruses (hepatitis):  Loss of appetite.  Feeling tired.  Skin or the white parts of your eyes turning yellow (jaundice).  For brain and spinal cord viruses:  Fever.  Headache.  Stiff neck.  Nausea and vomiting.  Confusion or being sleepy.  For skin viruses:  Warts.  Itching.  Rash.  For sexually transmitted viruses:  Discharge.  Swelling.  Redness.  Rash.  How is this diagnosed?  This condition may be diagnosed based on one or more of these:  Your symptoms and medical history.  A physical exam.  Tests, such as:  Blood tests.  Tests on a sample of mucus from your lungs (sputum sample).  Tests on a poop (stool) sample.  Tests on a swab of body fluids or a skin sore (lesion).  How is this treated?  Viruses can be hard to treat because they live within cells. Antibiotics do not treat viruses because these medicines do not get inside cells. Treatment for a viral illness may include:  Resting and drinking a lot of fluids.  Medicines to treat symptoms. These can include over-the-counter medicine for pain and fever, medicines for cough or congestion, and medicines for diarrhea.  Antiviral medicines. These medicines are available only for certain types of viruses.  Some viral illnesses can be prevented with vaccinations. A common example is the flu shot.    Follow these instructions at home:  Medicines  Take over-the-counter and prescription medicines only as told by your health care provider.  If you were prescribed an antiviral medicine, take it as told by your provider. Do not stop taking the antiviral even if you start to feel better.  Know when antibiotics are needed and when they are not needed. Antibiotics do not treat viruses. You may get an antibiotic if your provider thinks that you may have, or are at risk for, a bacterial infection and you have a viral infection.  Do not ask for an antibiotic prescription if you have been diagnosed with a viral illness. Antibiotics will not make your illness go away faster.  Taking antibiotics when they are not needed can lead to antibiotic resistance. When this develops, the medicine no longer works against the bacteria that it normally fights.  General instructions    Drink enough fluids to keep your pee (urine) pale yellow.  Rest as much as possible.  Return to your normal activities as told by your provider. Ask your provider what activities are safe for you.  How is this prevented?  A person washing hands with soap and water.  To lower your risk of getting another viral illness:  Wash your hands often with soap and water for at least 20 seconds. If soap and water are not available, use hand .  Avoid touching your nose, eyes, and mouth, especially if you have not washed your hands recently.  If anyone in your household has a viral infection, clean all household surfaces that may have been in contact with the virus. Use soap and hot water. You may also use a commercially prepared, bleach-containing solution.  Stay away from people who are sick with symptoms of a viral infection.  Do not share items such as toothbrushes and water bottles with other people.  Keep your vaccinations up to date. This includes getting a yearly flu shot.  Eat a healthy diet and get plenty of rest.  Contact a health care provider if:  You have symptoms of a viral illness that do not go away.  Your symptoms come back after going away.  Your symptoms get worse.  Get help right away if:  You have trouble breathing.  You have a severe headache or a stiff neck.  You have severe vomiting or pain in your abdomen.  These symptoms may be an emergency. Get help right away. Call 911.  Do not wait to see if the symptoms will go away.  Do not drive yourself to the hospital.  This information is not intended to replace advice given to you by your health care provider. Make sure you discuss any questions you have with your health care provider.

## 2025-06-01 NOTE — ED PROVIDER NOTE - NSICDXFAMILYHX_GEN_ALL_CORE_FT
FAMILY HISTORY:  Father  Still living? Yes, Estimated age: Age Unknown  FH: diabetes mellitus, Age at diagnosis: Age Unknown    Mother  Still living? No  FH: HTN (hypertension), Age at diagnosis: Age Unknown

## 2025-06-01 NOTE — ED PROVIDER NOTE - PATIENT PORTAL LINK FT
You can access the FollowMyHealth Patient Portal offered by [facility] by registering at the following website: [email or web address]. By joining [facility]’s FollowMyHealth portal, you will also be able to view your health information using other applications (apps) compatible with our system.

## 2025-06-01 NOTE — ED PROVIDER NOTE - CLINICAL SUMMARY MEDICAL DECISION MAKING FREE TEXT BOX
35-year-old female, denies medical history or daily medications, presents for evaluation of 2 weeks of intermittent cough, fever Tmax 101 Fahrenheit, acutely worsening over the past 2 days.  Everyone in her household is sick with similar symptoms.  Denies any chest pain, shortness of breath, abdominal pain, nausea or vomiting.  Able to tolerate p.o.  Was started on promethazine, dextromethorphan by primary care doctor 2 days ago but does not feel an improvement in symptoms.  Also has completed a Z-Tim.    Vitals nonactionable    Well appearing, no labored breathing, heart rrr, lungs clear, abdomen soft, nontender, no peripheral edema    Likely viral URI but given time period without improvement in symptoms, will eval for pna, give symptomatic tx, flu/covid/rsv swab, likely dc

## 2025-06-01 NOTE — ED ADULT TRIAGE NOTE - GLASGOW COMA SCALE: EYE OPENING, MLM
Adolescent Medicine  [facility]   [address], [address], [place] [address]  [address] [place] [address] (P) [phone] (F) [phone]      Date: 10/16/2024    Patient: Gadiel Owens    YOB: 2013   Date of Visit: 10/16/2024      To Whom it May Concern:    School Excuse:   Gadiel Owens was seen in my clinic on 10/16/2024.   Please excuse Gadiel for her absence from school on this date to attend her appointment.     Accommodations Request:   We request that accommodations be provided (e.g. via 504 plan or IEP), as they are deemed medically necessary to treat her mental health and support her health and well-being.     Diagnosis Codes:  1. SUKUMAR (generalized anxiety disorder) - F41.1  2. Current moderate episode of major depressive disorder without prior episode  (CMD) - F32.1    Thank you for your time, cooperation, and attention to this matter.       Sincerely,         Jeremy Rausch MD, MPH  Adolescent Medicine Physician  [facility]     Medical information is confidential and cannot be disclosed without the written consent of the patient or his representative.    (E4) spontaneous